# Patient Record
Sex: FEMALE | Race: WHITE
[De-identification: names, ages, dates, MRNs, and addresses within clinical notes are randomized per-mention and may not be internally consistent; named-entity substitution may affect disease eponyms.]

---

## 2017-09-22 LAB
CHOLESTEROL, TOTAL: NORMAL
CHOLESTEROL/HDL RATIO: NORMAL
HDLC SERPL-MCNC: NORMAL MG/DL
LDL CHOLESTEROL CALCULATED: NORMAL
TRIGL SERPL-MCNC: NORMAL MG/DL
VLDLC SERPL CALC-MCNC: NORMAL MG/DL

## 2019-02-16 ENCOUNTER — HOSPITAL ENCOUNTER (EMERGENCY)
Dept: HOSPITAL 83 - ED | Age: 49
Discharge: HOME | End: 2019-02-16
Payer: COMMERCIAL

## 2019-02-16 VITALS — WEIGHT: 240 LBS | HEIGHT: 65 IN | BODY MASS INDEX: 39.99 KG/M2

## 2019-02-16 VITALS — DIASTOLIC BLOOD PRESSURE: 77 MMHG

## 2019-02-16 DIAGNOSIS — Z79.899: ICD-10-CM

## 2019-02-16 DIAGNOSIS — R03.0: ICD-10-CM

## 2019-02-16 DIAGNOSIS — R10.31: Primary | ICD-10-CM

## 2019-02-16 DIAGNOSIS — Z79.2: ICD-10-CM

## 2019-02-16 DIAGNOSIS — E66.01: ICD-10-CM

## 2019-02-16 DIAGNOSIS — Z88.1: ICD-10-CM

## 2019-02-16 LAB
ALBUMIN SERPL-MCNC: 3.8 GM/DL (ref 3.1–4.5)
ALP SERPL-CCNC: 92 U/L (ref 45–117)
ALT SERPL W P-5'-P-CCNC: 25 U/L (ref 12–78)
APPEARANCE UR: (no result)
AST SERPL-CCNC: 15 IU/L (ref 3–35)
BACTERIA #/AREA URNS HPF: (no result) /[HPF]
BASOPHILS # BLD AUTO: 0.1 10*3/UL (ref 0–0.1)
BASOPHILS NFR BLD AUTO: 0.6 % (ref 0–1)
BILIRUB UR QL STRIP: NEGATIVE
BUN SERPL-MCNC: 14 MG/DL (ref 7–24)
CHLORIDE SERPL-SCNC: 108 MMOL/L (ref 98–107)
COLOR UR: YELLOW
CREAT SERPL-MCNC: 0.83 MG/DL (ref 0.55–1.02)
EOSINOPHIL # BLD AUTO: 0.1 10*3/UL (ref 0–0.4)
EOSINOPHIL # BLD AUTO: 1.1 % (ref 1–4)
EPI CELLS #/AREA URNS HPF: (no result) /[HPF]
ERYTHROCYTE [DISTWIDTH] IN BLOOD BY AUTOMATED COUNT: 12.9 % (ref 0–14.5)
GLUCOSE UR QL: NEGATIVE
HCT VFR BLD AUTO: 41.1 % (ref 37–47)
HGB BLD-MCNC: 13.6 G/DL (ref 12–16)
HGB UR QL STRIP: NEGATIVE
KETONES UR QL STRIP: NEGATIVE
LEUKOCYTE ESTERASE UR QL STRIP: NEGATIVE
LIPASE SERPL-CCNC: 111 U/L (ref 73–393)
LYMPHOCYTES # BLD AUTO: 2.3 10*3/UL (ref 1.3–4.4)
LYMPHOCYTES NFR BLD AUTO: 25.3 % (ref 27–41)
MCH RBC QN AUTO: 30.6 PG (ref 27–31)
MCHC RBC AUTO-ENTMCNC: 33.1 G/DL (ref 33–37)
MCV RBC AUTO: 92.6 FL (ref 81–99)
MONOCYTES # BLD AUTO: 0.6 10*3/UL (ref 0.1–1)
MONOCYTES NFR BLD MANUAL: 6.5 % (ref 3–9)
NEUT #: 6 10*3/UL (ref 2.3–7.9)
NEUT %: 65.8 % (ref 47–73)
NITRITE UR QL STRIP: NEGATIVE
NRBC BLD QL AUTO: 0 10*3/UL (ref 0–0)
PH UR STRIP: 6.5 [PH] (ref 5–9)
PLATELET # BLD AUTO: 227 10*3/UL (ref 130–400)
PMV BLD AUTO: 9.6 FL (ref 9.6–12.3)
POTASSIUM SERPL-SCNC: 4.3 MMOL/L (ref 3.5–5.1)
PROT SERPL-MCNC: 7.8 GM/DL (ref 6.4–8.2)
RBC # BLD AUTO: 4.44 10*6/UL (ref 4.1–5.1)
RBC #/AREA URNS HPF: (no result) RBC/HPF (ref 0–2)
SODIUM SERPL-SCNC: 142 MMOL/L (ref 136–145)
SP GR UR: 1.02 (ref 1–1.03)
UROBILINOGEN UR STRIP-MCNC: 0.2 E.U./DL (ref 0.2–1)
WBC #/AREA URNS HPF: (no result) WBC/HPF (ref 0–5)
WBC NRBC COR # BLD AUTO: 9.1 10*3/UL (ref 4.8–10.8)

## 2020-01-27 RX ORDER — FAMCICLOVIR 500 MG/1
500 TABLET, FILM COATED ORAL DAILY
COMMUNITY
Start: 2010-06-30 | End: 2020-09-21

## 2020-03-23 ENCOUNTER — TELEPHONE (OUTPATIENT)
Dept: FAMILY MEDICINE CLINIC | Age: 50
End: 2020-03-23

## 2020-03-23 NOTE — TELEPHONE ENCOUNTER
Since its been almost 2 years since we last saw her 7-21, would need to see her if we are going to give her more than 20 with no refills to get through the Coronovirus crisis which I probably could do.

## 2020-05-06 ENCOUNTER — TELEPHONE (OUTPATIENT)
Dept: PRIMARY CARE CLINIC | Age: 50
End: 2020-05-06

## 2020-05-06 NOTE — TELEPHONE ENCOUNTER
Pt's son tested positive for covid on 4/26. She has been on quarantine for 14 days with her son as advised by her employer. The 14 day quarantine will be up on 5/10. Her son has a covid follow up at [facility] on 5/12. She has no symptoms and has not had any yet while on quarantine. She would like to return to work after 5/10 but she is being told by the [facility] that she has to be on quarantine for 14 more days and she is unable to start the 14 days until her son is symptom free for 10 days and is cleared of COVID. Pt is concerned about being off of work for that amount of time. Please advise on what she should do.

## 2020-09-21 ENCOUNTER — HOSPITAL ENCOUNTER (OUTPATIENT)
Age: 50
Discharge: HOME | End: 2020-09-23
Payer: COMMERCIAL

## 2020-09-21 ENCOUNTER — OFFICE VISIT (OUTPATIENT)
Dept: FAMILY MEDICINE CLINIC | Age: 50
End: 2020-09-21
Payer: COMMERCIAL

## 2020-09-21 VITALS
BODY MASS INDEX: 45.45 KG/M2 | DIASTOLIC BLOOD PRESSURE: 88 MMHG | HEART RATE: 127 BPM | TEMPERATURE: 97.7 F | OXYGEN SATURATION: 96 % | SYSTOLIC BLOOD PRESSURE: 176 MMHG | HEIGHT: 65 IN | WEIGHT: 272.8 LBS

## 2020-09-21 PROBLEM — I10 ESSENTIAL HYPERTENSION: Status: ACTIVE | Noted: 2020-09-21

## 2020-09-21 PROBLEM — G93.2 PSEUDOTUMOR CEREBRI: Status: ACTIVE | Noted: 2020-09-21

## 2020-09-21 LAB
ALBUMIN SERPL-MCNC: 4.4 G/DL (ref 3.5–5.2)
ALP BLD-CCNC: 81 U/L (ref 35–104)
ALT SERPL-CCNC: 25 U/L (ref 0–32)
ANION GAP SERPL CALCULATED.3IONS-SCNC: 18 MMOL/L (ref 7–16)
AST SERPL-CCNC: 27 U/L (ref 0–31)
BASOPHILS ABSOLUTE: 0.05 E9/L (ref 0–0.2)
BASOPHILS RELATIVE PERCENT: 0.4 % (ref 0–2)
BILIRUB SERPL-MCNC: <0.2 MG/DL (ref 0–1.2)
BILIRUBIN URINE: NEGATIVE
BLOOD, URINE: NEGATIVE
BUN BLDV-MCNC: 11 MG/DL (ref 6–20)
CALCIUM SERPL-MCNC: 9.8 MG/DL (ref 8.6–10.2)
CHLORIDE BLD-SCNC: 104 MMOL/L (ref 98–107)
CHOLESTEROL, TOTAL: 197 MG/DL (ref 0–199)
CLARITY: CLEAR
CO2: 20 MMOL/L (ref 22–29)
COLOR: YELLOW
CREAT SERPL-MCNC: 0.8 MG/DL (ref 0.5–1)
EOSINOPHILS ABSOLUTE: 0.1 E9/L (ref 0.05–0.5)
EOSINOPHILS RELATIVE PERCENT: 0.8 % (ref 0–6)
GFR AFRICAN AMERICAN: >60
GFR NON-AFRICAN AMERICAN: >60 ML/MIN/1.73
GLUCOSE BLD-MCNC: 69 MG/DL (ref 74–99)
GLUCOSE URINE: NEGATIVE MG/DL
HCT VFR BLD CALC: 43.8 % (ref 34–48)
HDLC SERPL-MCNC: 44 MG/DL
HEMOGLOBIN: 13.6 G/DL (ref 11.5–15.5)
IMMATURE GRANULOCYTES #: 0.06 E9/L
IMMATURE GRANULOCYTES %: 0.5 % (ref 0–5)
KETONES, URINE: NEGATIVE MG/DL
LDL CHOLESTEROL CALCULATED: 89 MG/DL (ref 0–99)
LEUKOCYTE ESTERASE, URINE: NEGATIVE
LYMPHOCYTES ABSOLUTE: 3.55 E9/L (ref 1.5–4)
LYMPHOCYTES RELATIVE PERCENT: 30.1 % (ref 20–42)
MCH RBC QN AUTO: 29.8 PG (ref 26–35)
MCHC RBC AUTO-ENTMCNC: 31.1 % (ref 32–34.5)
MCV RBC AUTO: 95.8 FL (ref 80–99.9)
MONOCYTES ABSOLUTE: 0.87 E9/L (ref 0.1–0.95)
MONOCYTES RELATIVE PERCENT: 7.4 % (ref 2–12)
NEUTROPHILS ABSOLUTE: 7.18 E9/L (ref 1.8–7.3)
NEUTROPHILS RELATIVE PERCENT: 60.8 % (ref 43–80)
NITRITE, URINE: NEGATIVE
PDW BLD-RTO: 13.3 FL (ref 11.5–15)
PH UA: 6.5 (ref 5–9)
PLATELET # BLD: 260 E9/L (ref 130–450)
PMV BLD AUTO: 10 FL (ref 7–12)
POTASSIUM SERPL-SCNC: 4.3 MMOL/L (ref 3.5–5)
PROTEIN UA: NEGATIVE MG/DL
RBC # BLD: 4.57 E12/L (ref 3.5–5.5)
SODIUM BLD-SCNC: 142 MMOL/L (ref 132–146)
SPECIFIC GRAVITY UA: 1.02 (ref 1–1.03)
TOTAL PROTEIN: 7.2 G/DL (ref 6.4–8.3)
TRIGL SERPL-MCNC: 319 MG/DL (ref 0–149)
TSH SERPL DL<=0.05 MIU/L-ACNC: 4.32 UIU/ML (ref 0.27–4.2)
UROBILINOGEN, URINE: 0.2 E.U./DL
VLDLC SERPL CALC-MCNC: 64 MG/DL
WBC # BLD: 11.8 E9/L (ref 4.5–11.5)

## 2020-09-21 PROCEDURE — G8417 CALC BMI ABV UP PARAM F/U: HCPCS | Performed by: FAMILY MEDICINE

## 2020-09-21 PROCEDURE — 81003 URINALYSIS AUTO W/O SCOPE: CPT

## 2020-09-21 PROCEDURE — G8427 DOCREV CUR MEDS BY ELIG CLIN: HCPCS | Performed by: FAMILY MEDICINE

## 2020-09-21 PROCEDURE — 1036F TOBACCO NON-USER: CPT | Performed by: FAMILY MEDICINE

## 2020-09-21 PROCEDURE — 84443 ASSAY THYROID STIM HORMONE: CPT

## 2020-09-21 PROCEDURE — 85025 COMPLETE CBC W/AUTO DIFF WBC: CPT

## 2020-09-21 PROCEDURE — 36415 COLL VENOUS BLD VENIPUNCTURE: CPT

## 2020-09-21 PROCEDURE — 99214 OFFICE O/P EST MOD 30 MIN: CPT | Performed by: FAMILY MEDICINE

## 2020-09-21 PROCEDURE — 80053 COMPREHEN METABOLIC PANEL: CPT

## 2020-09-21 PROCEDURE — 80061 LIPID PANEL: CPT

## 2020-09-21 RX ORDER — LORAZEPAM 1 MG/1
TABLET ORAL
Qty: 20 TABLET | Refills: 1 | Status: SHIPPED
Start: 2020-09-21 | End: 2020-11-17 | Stop reason: SDUPTHER

## 2020-09-21 RX ORDER — ESCITALOPRAM OXALATE 10 MG/1
10 TABLET ORAL DAILY
Qty: 30 TABLET | Refills: 5 | Status: SHIPPED
Start: 2020-09-21 | End: 2021-03-11 | Stop reason: SDUPTHER

## 2020-09-21 RX ORDER — CARVEDILOL 6.25 MG/1
6.25 TABLET ORAL DAILY
Qty: 30 TABLET | Refills: 5 | Status: SHIPPED
Start: 2020-09-21 | End: 2021-03-11 | Stop reason: SDUPTHER

## 2020-09-21 SDOH — HEALTH STABILITY: MENTAL HEALTH: HOW OFTEN DO YOU HAVE A DRINK CONTAINING ALCOHOL?: MONTHLY OR LESS

## 2020-09-21 SDOH — HEALTH STABILITY: MENTAL HEALTH: HOW MANY STANDARD DRINKS CONTAINING ALCOHOL DO YOU HAVE ON A TYPICAL DAY?: 1 OR 2

## 2020-09-21 ASSESSMENT — ENCOUNTER SYMPTOMS
ABDOMINAL PAIN: 0
VOMITING: 0
WHEEZING: 0
DIARRHEA: 0
CONSTIPATION: 0
COUGH: 0
BLOOD IN STOOL: 0
EYES NEGATIVE: 1
CHEST TIGHTNESS: 0

## 2020-09-21 ASSESSMENT — PATIENT HEALTH QUESTIONNAIRE - PHQ9
SUM OF ALL RESPONSES TO PHQ9 QUESTIONS 1 & 2: 0
SUM OF ALL RESPONSES TO PHQ QUESTIONS 1-9: 0
2. FEELING DOWN, DEPRESSED OR HOPELESS: 0
1. LITTLE INTEREST OR PLEASURE IN DOING THINGS: 0
SUM OF ALL RESPONSES TO PHQ QUESTIONS 1-9: 0

## 2020-09-21 NOTE — PROGRESS NOTES
OFFICE NOTE    20  Name: Martin Cooper  CY   Sex:female   Age:49 y.o. SUBJECTIVE  Chief Complaint   Patient presents with    Anxiety       Patient presents for routine follow up. Has not been seen since 2018. Having issues with anxiety and depression. work is very stressful. Son has had some issues (switching majors and schools, COVID 19 and now back problems. She was advised by Neurologist she no longer needs to take meds for pseudotumor cerebri         Review of Systems   Constitutional: Negative for appetite change, fever and unexpected weight change. HENT: Negative for congestion, ear pain and postnasal drip. Eyes: Negative. Respiratory: Negative for cough, chest tightness and wheezing. Cardiovascular: Negative for chest pain and palpitations. Gastrointestinal: Negative for abdominal pain, blood in stool, constipation, diarrhea and vomiting. Endocrine: Negative for cold intolerance, polydipsia and polyuria. Genitourinary: Negative for dysuria and hematuria. Musculoskeletal: Negative for arthralgias, gait problem and joint swelling. Skin: Negative for rash and wound. Allergic/Immunologic: Negative for environmental allergies and food allergies. Neurological: Negative for dizziness, tremors, weakness and headaches. Hematological: Negative for adenopathy. Does not bruise/bleed easily. Psychiatric/Behavioral: Negative for behavioral problems, confusion, dysphoric mood and sleep disturbance. The patient is nervous/anxious.              Current Outpatient Medications:     escitalopram (LEXAPRO) 10 MG tablet, Take 1 tablet by mouth daily, Disp: 30 tablet, Rfl: 5    LORazepam (ATIVAN) 1 MG tablet, Once daily prn anxiety as rescue, Disp: 20 tablet, Rfl: 1    carvedilol (COREG) 6.25 MG tablet, Take 1 tablet by mouth daily, Disp: 30 tablet, Rfl: 5    Multiple Vitamins-Minerals (THERAPEUTIC MULTIVITAMIN-MINERALS) tablet, Take 1 tablet by mouth daily LD 17, Disp: , Rfl:   Allergies   Allergen Reactions    Doxycycline Rash       Past Medical History:   Diagnosis Date    Pseudotumor cerebri      Past Surgical History:   Procedure Laterality Date    BACK SURGERY  11/2015    CHOLECYSTECTOMY, LAPAROSCOPIC  06/01/2017    ENDOMETRIAL ABLATION  12/2012     Family History   Problem Relation Age of Onset    Cancer Mother         breast    No Known Problems Father     Cancer Maternal Grandfather         prostate    No Known Problems Paternal Grandmother     No Known Problems Paternal Grandfather      Social History     Tobacco History     Smoking Status  Never Smoker    Smokeless Tobacco Use  Never Used          Alcohol History     Alcohol Use Status  Yes Comment  social          Drug Use     Drug Use Status  No          Sexual Activity     Sexually Active  Not Currently Partners  Male              OBJECTIVE  Vitals:    09/21/20 1048   BP: (!) 176/88   Site: Right Upper Arm   Position: Sitting   Pulse: 127   Temp: 97.7 °F (36.5 °C)   TempSrc: Temporal   SpO2: 96%   Weight: 272 lb 12.8 oz (123.7 kg)   Height: 5' 5\" (1.651 m)        Body mass index is 45.4 kg/m².     Patient is obese    Orders Placed This Encounter   Procedures    CBC Auto Differential     Standing Status:   Future     Number of Occurrences:   1     Standing Expiration Date:   9/21/2021    Comprehensive Metabolic Panel     Standing Status:   Future     Number of Occurrences:   1     Standing Expiration Date:   9/21/2021    Lipid Panel     Standing Status:   Future     Number of Occurrences:   1     Standing Expiration Date:   9/21/2021     Order Specific Question:   Is Patient Fasting?/# of Hours     Answer:   8    TSH without Reflex     Standing Status:   Future     Number of Occurrences:   1     Standing Expiration Date:   9/21/2021    Urinalysis     Standing Status:   Future     Number of Occurrences:   1     Standing Expiration Date:   9/21/2021     Order Specific Question: SPECIFY(EX-CATH,MIDSTREAM,CYSTO,ETC)? Answer:   midstream        EXAM   Physical Exam  Vitals signs and nursing note reviewed. Constitutional:       Appearance: Normal appearance. She is well-developed. She is obese. HENT:      Right Ear: Tympanic membrane, ear canal and external ear normal.      Left Ear: Tympanic membrane, ear canal and external ear normal.      Nose: Nose normal.      Mouth/Throat:      Pharynx: Oropharynx is clear. Eyes:      General: No scleral icterus. Conjunctiva/sclera: Conjunctivae normal.      Pupils: Pupils are equal, round, and reactive to light. Neck:      Musculoskeletal: Neck supple. Thyroid: No thyroid mass or thyromegaly. Vascular: No carotid bruit or JVD. Trachea: Trachea normal.   Cardiovascular:      Rate and Rhythm: Normal rate and regular rhythm. Pulses: Normal pulses. Heart sounds: Normal heart sounds. No murmur. No gallop. Pulmonary:      Effort: Pulmonary effort is normal.      Breath sounds: Normal breath sounds. No wheezing, rhonchi or rales. Abdominal:      General: Bowel sounds are normal. There is no distension. Palpations: Abdomen is soft. There is no mass. Tenderness: There is no abdominal tenderness. There is no guarding. Hernia: No hernia is present. Musculoskeletal: Normal range of motion. General: No swelling or tenderness. Right lower leg: No edema. Left lower leg: No edema. Lymphadenopathy:      Cervical: No cervical adenopathy. Skin:     General: Skin is warm and dry. Capillary Refill: Capillary refill takes less than 2 seconds. Coloration: Skin is not jaundiced. Findings: No bruising or rash. Neurological:      General: No focal deficit present. Mental Status: She is alert and oriented to person, place, and time. Sensory: No sensory deficit. Motor: No weakness or abnormal muscle tone.       Coordination: Coordination normal.      Gait: Gait normal.   Psychiatric:         Mood and Affect: Mood normal.         Behavior: Behavior normal.           [facility] for anxiety. Diagnoses and all orders for this visit:    Encounter for general adult medical examination without abnormal findings  -     CBC Auto Differential; Future  -     Comprehensive Metabolic Panel; Future  -     Lipid Panel; Future  -     TSH without Reflex; Future  -     Urinalysis; Future  Needed caught up on BW etc.   Anxiety  -     CBC Auto Differential; Future  -     Comprehensive Metabolic Panel; Future  -     Lipid Panel; Future  -     TSH without Reflex; Future  -     Urinalysis; Future  -     LORazepam (ATIVAN) 1 MG tablet; Once daily prn anxiety as rescue  Has been on SSRI and Lamictal which she did not like in past    Pseudotumor cerebri  -     CBC Auto Differential; Future  -     Comprehensive Metabolic Panel; Future  -     Lipid Panel; Future  -     TSH without Reflex; Future  -     Urinalysis; Future  Resolved, off Diamox    Essential hypertension. Anxious but quite high today. Will try Carvediol    Other orders  -     escitalopram (LEXAPRO) 10 MG tablet; Take 1 tablet by mouth daily  -     carvedilol (COREG) 6.25 MG tablet; Take 1 tablet by mouth daily          Return in about 1 month (around 10/21/2020). Electronically signed by Khushi Flynn MD on 9/21/20 at 12:08 PM EDT    I have personally reviewed and updated the chief complaint, HPI, Past Medical, Family and Social History, as well as the above Review of Systems.

## 2020-09-25 ENCOUNTER — TELEPHONE (OUTPATIENT)
Dept: ADMINISTRATIVE | Age: 50
End: 2020-09-25

## 2020-09-25 NOTE — TELEPHONE ENCOUNTER
Pt was returning a call back to [address]? With information she needed to give her. [facility] tried to connect with office, got an all circuits busy message twice.   Pt would appreciate a call back concerning this matter

## 2020-10-22 ENCOUNTER — OFFICE VISIT (OUTPATIENT)
Dept: FAMILY MEDICINE CLINIC | Age: 50
End: 2020-10-22
Payer: COMMERCIAL

## 2020-10-22 VITALS
TEMPERATURE: 97.1 F | OXYGEN SATURATION: 92 % | DIASTOLIC BLOOD PRESSURE: 78 MMHG | SYSTOLIC BLOOD PRESSURE: 118 MMHG | HEART RATE: 102 BPM

## 2020-10-22 PROCEDURE — G8417 CALC BMI ABV UP PARAM F/U: HCPCS | Performed by: FAMILY MEDICINE

## 2020-10-22 PROCEDURE — G8427 DOCREV CUR MEDS BY ELIG CLIN: HCPCS | Performed by: FAMILY MEDICINE

## 2020-10-22 PROCEDURE — G8484 FLU IMMUNIZE NO ADMIN: HCPCS | Performed by: FAMILY MEDICINE

## 2020-10-22 PROCEDURE — 1036F TOBACCO NON-USER: CPT | Performed by: FAMILY MEDICINE

## 2020-10-22 PROCEDURE — 99213 OFFICE O/P EST LOW 20 MIN: CPT | Performed by: FAMILY MEDICINE

## 2020-10-22 ASSESSMENT — ENCOUNTER SYMPTOMS
BLOOD IN STOOL: 0
VOMITING: 0
COUGH: 0
ABDOMINAL PAIN: 0
WHEEZING: 0
EYES NEGATIVE: 1
CONSTIPATION: 0
DIARRHEA: 0
CHEST TIGHTNESS: 0

## 2020-10-22 NOTE — PROGRESS NOTES
OFFICE NOTE    10/22/20  Name: Adriel Lim  JHE:[birth year]   Sex:female   Age:49 y.o. SUBJECTIVE  Chief Complaint   Patient presents with    Anxiety    Hypertension       Patient presents for routine follow up post medication adjustment. Denies further complaints. Tolerating both coreg and lexapro. Feels like overall anxiety is lessening. Son's problems seem to be lessening      Review of Systems   Constitutional: Negative for appetite change, fever and unexpected weight change. HENT: Negative for congestion, ear pain and postnasal drip. Eyes: Negative. Negative for photophobia and redness. Respiratory: Negative for cough, chest tightness, shortness of breath and wheezing. Cardiovascular: Negative for chest pain and palpitations. Gastrointestinal: Negative for abdominal pain, blood in stool, constipation, diarrhea and vomiting. Endocrine: Negative for cold intolerance, polydipsia and polyuria. Genitourinary: Positive for urgency. Negative for dysuria and hematuria. Musculoskeletal: Positive for arthralgias. Negative for gait problem and joint swelling. Skin: Negative for rash and wound. Allergic/Immunologic: Negative for environmental allergies and food allergies. Neurological: Negative for dizziness, tremors, seizures, weakness, numbness and headaches. Hematological: Negative for adenopathy. Does not bruise/bleed easily. Psychiatric/Behavioral: Negative for behavioral problems, confusion, dysphoric mood and sleep disturbance. The patient is nervous/anxious. Some OCD issues which are improving also.             Current Outpatient Medications:     tretinoin (RETIN-A) 0.025 % cream, Apply topically nightly., Disp: 45 g, Rfl: 1    escitalopram (LEXAPRO) 10 MG tablet, Take 1 tablet by mouth daily, Disp: 30 tablet, Rfl: 5    LORazepam (ATIVAN) 1 MG tablet, Once daily prn anxiety as rescue, Disp: 20 tablet, Rfl: 1    carvedilol (COREG) 6.25 MG tablet, Take 1 tablet by mouth daily, Disp: 30 tablet, Rfl: 5    Multiple Vitamins-Minerals (THERAPEUTIC MULTIVITAMIN-MINERALS) tablet, Take 1 tablet by mouth daily LD 5-23-17, Disp: , Rfl:   Allergies   Allergen Reactions    Doxycycline Rash       Past Medical History:   Diagnosis Date    Pseudotumor cerebri      Past Surgical History:   Procedure Laterality Date    BACK SURGERY  11/2015    CHOLECYSTECTOMY, LAPAROSCOPIC  06/01/2017    ENDOMETRIAL ABLATION  12/2012     Family History   Problem Relation Age of Onset    Cancer Mother         breast    No Known Problems Father     Cancer Maternal Grandfather         prostate    No Known Problems Paternal Grandmother     No Known Problems Paternal Grandfather      Social History     Tobacco History     Smoking Status  Never Smoker    Smokeless Tobacco Use  Never Used          Alcohol History     Alcohol Use Status  Yes Comment  social          Drug Use     Drug Use Status  No          Sexual Activity     Sexually Active  Not Currently Partners  Male              OBJECTIVE  Vitals:    10/22/20 0939   BP: 118/78   Site: Right Upper Arm   Position: Sitting   Pulse: 102   Temp: 97.1 °F (36.2 °C)   TempSrc: Temporal   SpO2: 92%        There is no height or weight on file to calculate BMI. Patient is obese    No orders of the defined types were placed in this encounter. EXAM   Physical Exam  Vitals signs and nursing note reviewed. Constitutional:       Appearance: Normal appearance. She is well-developed. She is obese. HENT:      Right Ear: Tympanic membrane, ear canal and external ear normal.      Left Ear: Tympanic membrane, ear canal and external ear normal.      Nose: Nose normal.   Eyes:      General: No scleral icterus. Conjunctiva/sclera: Conjunctivae normal.      Pupils: Pupils are equal, round, and reactive to light. Neck:      Musculoskeletal: Neck supple. No muscular tenderness. Thyroid: No thyroid mass or thyromegaly. Vascular: No JVD. Trachea: Trachea normal.   Cardiovascular:      Rate and Rhythm: Normal rate and regular rhythm. Pulses: Normal pulses. Heart sounds: Normal heart sounds. No murmur. No gallop. Pulmonary:      Effort: Pulmonary effort is normal.      Breath sounds: Normal breath sounds. No wheezing, rhonchi or rales. Abdominal:      General: Bowel sounds are normal. There is no distension. Palpations: Abdomen is soft. There is no mass. Tenderness: There is no abdominal tenderness. There is no guarding. Musculoskeletal: Normal range of motion. General: No swelling or tenderness. Right lower leg: No edema. Left lower leg: No edema. Lymphadenopathy:      Cervical: No cervical adenopathy. Skin:     General: Skin is warm and dry. Capillary Refill: Capillary refill takes less than 2 seconds. Coloration: Skin is not jaundiced. Findings: No bruising or rash. Neurological:      General: No focal deficit present. Mental Status: She is alert and oriented to person, place, and time. Motor: No abnormal muscle tone. Psychiatric:         Mood and Affect: Mood normal.         Behavior: Behavior normal.           Arabella Akers was seen today for anxiety and hypertension. Diagnoses and all orders for this visit:    Essential hypertension  Well controlled no changes made. Pseudotumor cerebri. Was taken off Diamox and seems to be doing well. Should see her eye doctor annually    Anxiety. Rarely takes ativan. Feeling much better and in control    Other orders  -     tretinoin (RETIN-A) 0.025 % cream; Apply topically nightly. Return in about 5 months (around 3/22/2021). Electronically signed by Galileo Hurley MD on 10/22/20 at 10:11 AM EDT    I have personally reviewed and updated the chief complaint, HPI, Past Medical, Family and Social History, as well as the above Review of Systems.

## 2020-10-23 PROBLEM — F41.9 ANXIETY: Status: ACTIVE | Noted: 2020-10-23

## 2020-10-23 ASSESSMENT — ENCOUNTER SYMPTOMS
PHOTOPHOBIA: 0
EYE REDNESS: 0
SHORTNESS OF BREATH: 0

## 2020-11-17 RX ORDER — LORAZEPAM 1 MG/1
TABLET ORAL
Qty: 20 TABLET | Refills: 1 | Status: SHIPPED
Start: 2020-11-17 | End: 2021-03-11 | Stop reason: SDUPTHER

## 2020-11-17 RX ORDER — FAMCICLOVIR 500 MG/1
500 TABLET, FILM COATED ORAL DAILY
Qty: 30 TABLET | Refills: 5 | Status: SHIPPED
Start: 2020-11-17 | End: 2021-03-11 | Stop reason: SDUPTHER

## 2020-11-17 NOTE — TELEPHONE ENCOUNTER
Last Appointment:  10/22/2020  Future Appointments   Date Time Provider Andre Salazar   3/18/2021  8:30 AM Patricia Ybarra  [address] [facility] [address]

## 2020-12-03 ENCOUNTER — HOSPITAL ENCOUNTER (OUTPATIENT)
Age: 50
Discharge: HOME | End: 2020-12-05

## 2020-12-03 PROCEDURE — 88305 TISSUE EXAM BY PATHOLOGIST: CPT

## 2020-12-04 ENCOUNTER — HOSPITAL ENCOUNTER (OUTPATIENT)
Age: 50
Discharge: HOME | End: 2020-12-06

## 2020-12-04 LAB
ANION GAP SERPL CALCULATED.3IONS-SCNC: 9 MMOL/L (ref 7–16)
BUN BLDV-MCNC: 8 MG/DL (ref 6–20)
CALCIUM SERPL-MCNC: 9.3 MG/DL (ref 8.6–10.2)
CHLORIDE BLD-SCNC: 101 MMOL/L (ref 98–107)
CO2: 26 MMOL/L (ref 22–29)
CREAT SERPL-MCNC: 0.7 MG/DL (ref 0.5–1)
GFR AFRICAN AMERICAN: >60
GFR NON-AFRICAN AMERICAN: >60 ML/MIN/1.73
GLUCOSE BLD-MCNC: 133 MG/DL (ref 74–99)
HCT VFR BLD CALC: 37.6 % (ref 34–48)
HEMOGLOBIN: 12.1 G/DL (ref 11.5–15.5)
MCH RBC QN AUTO: 30.2 PG (ref 26–35)
MCHC RBC AUTO-ENTMCNC: 32.2 % (ref 32–34.5)
MCV RBC AUTO: 93.8 FL (ref 80–99.9)
PDW BLD-RTO: 13.2 FL (ref 11.5–15)
PLATELET # BLD: 236 E9/L (ref 130–450)
PMV BLD AUTO: 10.3 FL (ref 7–12)
POTASSIUM SERPL-SCNC: 4.1 MMOL/L (ref 3.5–5)
RBC # BLD: 4.01 E12/L (ref 3.5–5.5)
SODIUM BLD-SCNC: 136 MMOL/L (ref 132–146)
WBC # BLD: 17.9 E9/L (ref 4.5–11.5)

## 2020-12-04 PROCEDURE — 80048 BASIC METABOLIC PNL TOTAL CA: CPT

## 2020-12-04 PROCEDURE — 85027 COMPLETE CBC AUTOMATED: CPT

## 2021-02-04 ENCOUNTER — OFFICE VISIT (OUTPATIENT)
Dept: FAMILY MEDICINE CLINIC | Age: 51
End: 2021-02-04
Payer: COMMERCIAL

## 2021-02-04 VITALS
SYSTOLIC BLOOD PRESSURE: 130 MMHG | HEIGHT: 65 IN | OXYGEN SATURATION: 98 % | RESPIRATION RATE: 16 BRPM | DIASTOLIC BLOOD PRESSURE: 82 MMHG | HEART RATE: 82 BPM | BODY MASS INDEX: 45.48 KG/M2 | TEMPERATURE: 97.7 F | WEIGHT: 273 LBS

## 2021-02-04 DIAGNOSIS — G58.8 INTERCOSTAL NEURALGIA: Primary | ICD-10-CM

## 2021-02-04 PROCEDURE — 99213 OFFICE O/P EST LOW 20 MIN: CPT | Performed by: FAMILY MEDICINE

## 2021-02-04 PROCEDURE — 96372 THER/PROPH/DIAG INJ SC/IM: CPT | Performed by: FAMILY MEDICINE

## 2021-02-04 RX ORDER — METHYLPREDNISOLONE 4 MG/1
TABLET ORAL
Qty: 1 KIT | Refills: 0 | Status: SHIPPED
Start: 2021-02-04 | End: 2021-03-11

## 2021-02-04 RX ORDER — IBUPROFEN 600 MG/1
300 TABLET ORAL EVERY 6 HOURS PRN
Qty: 60 TABLET | Refills: 1 | Status: SHIPPED
Start: 2021-02-04 | End: 2021-03-11

## 2021-02-04 RX ORDER — CYCLOBENZAPRINE HCL 5 MG
5 TABLET ORAL 3 TIMES DAILY PRN
Qty: 21 TABLET | Refills: 0 | Status: SHIPPED
Start: 2021-02-04 | End: 2021-03-11

## 2021-02-04 RX ORDER — METHYLPREDNISOLONE ACETATE 80 MG/ML
60 INJECTION, SUSPENSION INTRA-ARTICULAR; INTRALESIONAL; INTRAMUSCULAR; SOFT TISSUE ONCE
Status: COMPLETED | OUTPATIENT
Start: 2021-02-04 | End: 2021-02-04

## 2021-02-04 RX ADMIN — METHYLPREDNISOLONE ACETATE 60 MG: 80 INJECTION, SUSPENSION INTRA-ARTICULAR; INTRALESIONAL; INTRAMUSCULAR; SOFT TISSUE at 12:48

## 2021-02-04 ASSESSMENT — ENCOUNTER SYMPTOMS
RESPIRATORY NEGATIVE: 1
BACK PAIN: 1
COLOR CHANGE: 0

## 2021-02-04 NOTE — PROGRESS NOTES
21  Estela Grandchild : 1970 Sex: female  Age: 53 y.o. Chief Complaint   Patient presents with    Back Pain     started  getting worse, history of kidney stones years ago    Nausea       She is a 28-year-old white female with chief complaint of pain in her mid thoracic spine with radiation along the intercostal muscles at ribs 5 6-7 on the left. There is palpatory tenderness intercostally on palpation. She has no cough shortness of breath no history of trauma or injury. No shortness of breath no pain with deep inspiration. No rashes evident no vesiculation. Review of Systems   Constitutional: Negative. Respiratory: Negative. Musculoskeletal: Positive for arthralgias, back pain and myalgias. Skin: Negative. Negative for color change, rash and wound.          Current Outpatient Medications:     methylPREDNISolone (MEDROL DOSEPACK) 4 MG tablet, Take by mouth., Disp: 1 kit, Rfl: 0    cyclobenzaprine (FLEXERIL) 5 MG tablet, Take 1 tablet by mouth 3 times daily as needed for Muscle spasms, Disp: 21 tablet, Rfl: 0    ibuprofen (ADVIL;MOTRIN) 600 MG tablet, Take 0.5 tablets by mouth every 6 hours as needed for Pain, Disp: 60 tablet, Rfl: 1    famciclovir (FAMVIR) 500 MG tablet, Take 1 tablet by mouth daily, Disp: 30 tablet, Rfl: 5    tretinoin (RETIN-A) 0.025 % cream, Apply topically nightly., Disp: 45 g, Rfl: 1    escitalopram (LEXAPRO) 10 MG tablet, Take 1 tablet by mouth daily, Disp: 30 tablet, Rfl: 5    carvedilol (COREG) 6.25 MG tablet, Take 1 tablet by mouth daily, Disp: 30 tablet, Rfl: 5    Multiple Vitamins-Minerals (THERAPEUTIC MULTIVITAMIN-MINERALS) tablet, Take 1 tablet by mouth daily LD 5-, Disp: , Rfl:   Allergies   Allergen Reactions    Doxycycline Rash       Past Medical History:   Diagnosis Date    Pseudotumor cerebri      Past Surgical History:   Procedure Laterality Date    BACK SURGERY  2015    CHOLECYSTECTOMY, LAPAROSCOPIC  2017    ENDOMETRIAL ABLATION  12/2012     Family History   Problem Relation Age of Onset    Cancer Mother         breast    No Known Problems Father     Cancer Maternal Grandfather         prostate    No Known Problems Paternal Grandmother     No Known Problems Paternal Grandfather      Social History     Tobacco Use    Smoking status: Never Smoker    Smokeless tobacco: Never Used   Substance Use Topics    Alcohol use: Yes     Frequency: Monthly or less     Drinks per session: 1 or 2     Binge frequency: Never     Comment: social    Drug use: No        Vitals:    02/04/21 1222   BP: 130/82   Pulse: 82   Resp: 16   Temp: 97.7 °F (36.5 °C)   TempSrc: Temporal   SpO2: 98%   Weight: 273 lb (123.8 kg)   Height: 5' 5\" (1.651 m)       Physical Exam  Vitals signs reviewed. Constitutional:       Appearance: Normal appearance. HENT:      Head: Normocephalic and atraumatic. Right Ear: Tympanic membrane, ear canal and external ear normal.      Left Ear: Tympanic membrane, ear canal and external ear normal.   Cardiovascular:      Rate and Rhythm: Normal rate and regular rhythm. Heart sounds: No murmur. Pulmonary:      Effort: Pulmonary effort is normal.      Breath sounds: Normal breath sounds. Abdominal:      General: Abdomen is flat. Palpations: Abdomen is soft. Musculoskeletal:         General: Tenderness present. No signs of injury. Skin:     General: Skin is warm and dry. Findings: No erythema or rash. Neurological:      Mental Status: She is alert. Assessment and Plan:  Sharla Rader was seen today for back pain and nausea. Diagnoses and all orders for this visit:    Intercostal neuralgia    Other orders  -     methylPREDNISolone (MEDROL DOSEPACK) 4 MG tablet; Take by mouth. -     cyclobenzaprine (FLEXERIL) 5 MG tablet; Take 1 tablet by mouth 3 times daily as needed for Muscle spasms  -     ibuprofen (ADVIL;MOTRIN) 600 MG tablet;  Take 0.5 tablets by mouth every 6 hours as needed for Pain        Orders Placed This Encounter   Medications    methylPREDNISolone (MEDROL DOSEPACK) 4 MG tablet     Sig: Take by mouth. Dispense:  1 kit     Refill:  0    cyclobenzaprine (FLEXERIL) 5 MG tablet     Sig: Take 1 tablet by mouth 3 times daily as needed for Muscle spasms     Dispense:  21 tablet     Refill:  0    ibuprofen (ADVIL;MOTRIN) 600 MG tablet     Sig: Take 0.5 tablets by mouth every 6 hours as needed for Pain     Dispense:  60 tablet     Refill:  1        Patient advised to follow up with PCP as needed. Seen By:  Julisa Tian, DO  She is to use some hot moist packs to the area 10 minutes 3 times daily in addition to increasing are improving her posture and I did recommend some massage therapy to the area.

## 2021-03-11 ENCOUNTER — OFFICE VISIT (OUTPATIENT)
Dept: FAMILY MEDICINE CLINIC | Age: 51
End: 2021-03-11
Payer: COMMERCIAL

## 2021-03-11 VITALS
WEIGHT: 260 LBS | HEART RATE: 96 BPM | OXYGEN SATURATION: 93 % | SYSTOLIC BLOOD PRESSURE: 130 MMHG | BODY MASS INDEX: 43.27 KG/M2 | TEMPERATURE: 97.8 F | DIASTOLIC BLOOD PRESSURE: 82 MMHG

## 2021-03-11 DIAGNOSIS — I10 ESSENTIAL HYPERTENSION: Primary | ICD-10-CM

## 2021-03-11 DIAGNOSIS — G93.2 PSEUDOTUMOR CEREBRI: ICD-10-CM

## 2021-03-11 DIAGNOSIS — F41.9 ANXIETY: ICD-10-CM

## 2021-03-11 PROCEDURE — 99214 OFFICE O/P EST MOD 30 MIN: CPT | Performed by: FAMILY MEDICINE

## 2021-03-11 RX ORDER — ESCITALOPRAM OXALATE 10 MG/1
10 TABLET ORAL DAILY
Qty: 30 TABLET | Refills: 5 | Status: SHIPPED
Start: 2021-03-11 | End: 2021-09-17 | Stop reason: SDUPTHER

## 2021-03-11 RX ORDER — LORAZEPAM 1 MG/1
TABLET ORAL
Qty: 20 TABLET | Refills: 1 | Status: SHIPPED
Start: 2021-03-11 | End: 2021-05-18 | Stop reason: SDUPTHER

## 2021-03-11 RX ORDER — FAMCICLOVIR 500 MG/1
500 TABLET, FILM COATED ORAL DAILY
Qty: 30 TABLET | Refills: 5 | Status: SHIPPED
Start: 2021-03-11 | End: 2021-09-17 | Stop reason: SDUPTHER

## 2021-03-11 RX ORDER — CARVEDILOL 6.25 MG/1
6.25 TABLET ORAL DAILY
Qty: 30 TABLET | Refills: 5 | Status: SHIPPED
Start: 2021-03-11 | End: 2021-08-05 | Stop reason: DRUGHIGH

## 2021-03-11 ASSESSMENT — ENCOUNTER SYMPTOMS
DIARRHEA: 0
EYE REDNESS: 0
PHOTOPHOBIA: 0
ABDOMINAL PAIN: 0
CONSTIPATION: 0
CHEST TIGHTNESS: 0
VOMITING: 0
EYES NEGATIVE: 1
WHEEZING: 0
COUGH: 0
BLOOD IN STOOL: 0

## 2021-03-11 ASSESSMENT — PATIENT HEALTH QUESTIONNAIRE - PHQ9
SUM OF ALL RESPONSES TO PHQ QUESTIONS 1-9: 0
1. LITTLE INTEREST OR PLEASURE IN DOING THINGS: 0

## 2021-03-11 NOTE — PROGRESS NOTES
OFFICE NOTE    3/11/21  Name: Terri Palmer  JLI:[id number]   Sex:female   Age:50 y.o. SUBJECTIVE  Chief Complaint   Patient presents with    Hypertension    Anxiety    Numbness     left leg/arm       Patient presents for routine follow up. Having issues with occasional numbness to L side extremities. reqruies routine medication refill.s   Had breast reduction surgery. Very happy with results Has been off Diamox for 2 years and doing well        Review of Systems   Constitutional: Positive for fatigue. Negative for appetite change, fever and unexpected weight change. HENT: Negative for congestion, ear pain and postnasal drip. Eyes: Negative. Negative for photophobia, redness and visual disturbance. Respiratory: Negative for cough, chest tightness and wheezing. Cardiovascular: Negative for chest pain and palpitations. Gastrointestinal: Negative for abdominal pain, blood in stool, constipation, diarrhea and vomiting. Endocrine: Negative for cold intolerance, polydipsia and polyuria. Genitourinary: Negative for dysuria, hematuria and urgency. Musculoskeletal: Positive for arthralgias. Negative for gait problem and joint swelling. Skin: Negative for rash and wound. Allergic/Immunologic: Negative for environmental allergies and food allergies. Neurological: Negative for dizziness, tremors, seizures, weakness, numbness and headaches. Hematological: Negative for adenopathy. Does not bruise/bleed easily. Psychiatric/Behavioral: Negative for behavioral problems, confusion, dysphoric mood and sleep disturbance. The patient is nervous/anxious.              Current Outpatient Medications:     carvedilol (COREG) 6.25 MG tablet, Take 1 tablet by mouth daily, Disp: 30 tablet, Rfl: 5    escitalopram (LEXAPRO) 10 MG tablet, Take 1 tablet by mouth daily, Disp: 30 tablet, Rfl: 5    famciclovir (FAMVIR) 500 MG tablet, Take 1 tablet by mouth daily, Disp: 30 tablet, Rfl: 5    LORazepam (ATIVAN) 1 MG tablet, Once daily prn anxiety as rescue, Disp: 20 tablet, Rfl: 1    tretinoin (RETIN-A) 0.025 % cream, Apply topically nightly., Disp: 45 g, Rfl: 1    Multiple Vitamins-Minerals (THERAPEUTIC MULTIVITAMIN-MINERALS) tablet, Take 1 tablet by mouth daily LD 5-23-17, Disp: , Rfl:   Allergies   Allergen Reactions    Doxycycline Rash       Past Medical History:   Diagnosis Date    Pseudotumor cerebri      Past Surgical History:   Procedure Laterality Date    BACK SURGERY  11/2015    CHOLECYSTECTOMY, LAPAROSCOPIC  06/01/2017    ENDOMETRIAL ABLATION  12/2012     Family History   Problem Relation Age of Onset    Cancer Mother         breast    No Known Problems Father     Cancer Maternal Grandfather         prostate    No Known Problems Paternal Grandmother     No Known Problems Paternal Grandfather      Social History     Tobacco History     Smoking Status  Never Smoker    Smokeless Tobacco Use  Never Used          Alcohol History     Alcohol Use Status  Yes Comment  social          Drug Use     Drug Use Status  No          Sexual Activity     Sexually Active  Not Currently Partners  Male              OBJECTIVE  Vitals:    03/11/21 1457   BP: 130/82   Site: Left Upper Arm   Position: Sitting   Pulse: 96   Temp: 97.8 °F (36.6 °C)   TempSrc: Temporal   SpO2: 93%   Weight: 260 lb (117.9 kg)        Body mass index is 43.27 kg/m². Patient is obese    No orders of the defined types were placed in this encounter. EXAM   Physical Exam  Vitals signs and nursing note reviewed. Constitutional:       Appearance: Normal appearance. She is well-developed. She is obese. HENT:      Head: Normocephalic. Right Ear: Tympanic membrane, ear canal and external ear normal.      Left Ear: Tympanic membrane, ear canal and external ear normal.      Nose: Nose normal.      Mouth/Throat:      Pharynx: Oropharynx is clear. Eyes:      General: No scleral icterus.      Conjunctiva/sclera: Conjunctivae normal.   Neck: Musculoskeletal: Normal range of motion and neck supple. Thyroid: No thyroid mass or thyromegaly. Vascular: No JVD. Trachea: Trachea normal.   Cardiovascular:      Rate and Rhythm: Normal rate and regular rhythm. Pulses: Normal pulses. Heart sounds: Normal heart sounds. No murmur. No gallop. Pulmonary:      Effort: Pulmonary effort is normal.      Breath sounds: Normal breath sounds. No wheezing, rhonchi or rales. Abdominal:      General: Bowel sounds are normal. There is no distension. Palpations: Abdomen is soft. There is no mass. Tenderness: There is no abdominal tenderness. There is no guarding. Hernia: No hernia is present. Musculoskeletal: Normal range of motion. General: No swelling or tenderness. Right lower leg: No edema. Left lower leg: No edema. Comments: Some OA in knees   Lymphadenopathy:      Cervical: No cervical adenopathy. Skin:     General: Skin is warm and dry. Capillary Refill: Capillary refill takes less than 2 seconds. Findings: No rash. Neurological:      General: No focal deficit present. Mental Status: She is alert and oriented to person, place, and time. Sensory: No sensory deficit. Motor: No weakness or abnormal muscle tone. Coordination: Coordination normal.      Gait: Gait normal.   Psychiatric:         Mood and Affect: Mood normal.         Behavior: Behavior normal.           Ramone Gardner was seen today for hypertension, anxiety and numbness. Diagnoses and all orders for this visit:    Essential hypertension  -     carvedilol (COREG) 6.25 MG tablet; Take 1 tablet by mouth daily  Well controlled, no changes made  Anxiety  -     escitalopram (LEXAPRO) 10 MG tablet; Take 1 tablet by mouth daily  -     LORazepam (ATIVAN) 1 MG tablet; Once daily prn anxiety as rescue  [place] checked.  Likes job mostly  Pseudotumor cerebri  Appears to have resolved, doing well off meds  Other orders  - famciclovir (FAMVIR) 500 MG tablet; Take 1 tablet by mouth daily          No follow-ups on file. Electronically signed by Thelma Jarrell MD on 3/11/21 at 3:15 PM EST    I have personally reviewed and updated the chief complaint, HPI, Past Medical, Family and Social History, as well as the above Review of Systems.

## 2021-05-18 DIAGNOSIS — F41.9 ANXIETY: ICD-10-CM

## 2021-05-18 RX ORDER — LORAZEPAM 1 MG/1
TABLET ORAL
Qty: 20 TABLET | Refills: 1 | Status: SHIPPED
Start: 2021-05-18 | End: 2021-07-29 | Stop reason: SDUPTHER

## 2021-07-29 ENCOUNTER — OFFICE VISIT (OUTPATIENT)
Dept: FAMILY MEDICINE CLINIC | Age: 51
End: 2021-07-29
Payer: COMMERCIAL

## 2021-07-29 VITALS
SYSTOLIC BLOOD PRESSURE: 132 MMHG | TEMPERATURE: 97.4 F | BODY MASS INDEX: 46.76 KG/M2 | DIASTOLIC BLOOD PRESSURE: 82 MMHG | OXYGEN SATURATION: 95 % | HEART RATE: 104 BPM | WEIGHT: 281 LBS

## 2021-07-29 DIAGNOSIS — R53.83 FATIGUE, UNSPECIFIED TYPE: ICD-10-CM

## 2021-07-29 DIAGNOSIS — F41.9 ANXIETY: ICD-10-CM

## 2021-07-29 DIAGNOSIS — R63.5 WEIGHT GAIN: ICD-10-CM

## 2021-07-29 DIAGNOSIS — R06.09 DYSPNEA ON EXERTION: Primary | ICD-10-CM

## 2021-07-29 PROCEDURE — 99214 OFFICE O/P EST MOD 30 MIN: CPT | Performed by: FAMILY MEDICINE

## 2021-07-29 PROCEDURE — 93000 ELECTROCARDIOGRAM COMPLETE: CPT | Performed by: FAMILY MEDICINE

## 2021-07-29 RX ORDER — LORAZEPAM 1 MG/1
TABLET ORAL
Qty: 30 TABLET | Refills: 2 | Status: SHIPPED
Start: 2021-07-29 | End: 2022-02-02 | Stop reason: SDUPTHER

## 2021-07-29 ASSESSMENT — ENCOUNTER SYMPTOMS
COUGH: 0
CONSTIPATION: 0
BLOOD IN STOOL: 0
CHEST TIGHTNESS: 0
WHEEZING: 0
DIARRHEA: 0
PHOTOPHOBIA: 0
SHORTNESS OF BREATH: 1
EYE REDNESS: 0
ABDOMINAL PAIN: 0
EYES NEGATIVE: 1
BACK PAIN: 1
VOMITING: 0

## 2021-07-29 NOTE — PROGRESS NOTES
OFFICE NOTE    21  Name: Juanita Todd  U:   Sex:female   Age:50 y.o. SUBJECTIVE  Chief Complaint   Patient presents with    Hypertension    Anxiety    Edema    Shortness of Breath    Palpitations       HPI reports BERRY, weight gain, increase in anxiety over past 6-12 mos. Worried about her heart. Did not know father, mother  young of breast cancer. Review of Systems   Constitutional: Positive for fatigue. Negative for appetite change, fever and unexpected weight change. HENT: Negative for congestion, ear pain and postnasal drip. Eyes: Negative. Negative for photophobia, redness and visual disturbance. Respiratory: Positive for shortness of breath. Negative for cough, chest tightness and wheezing. Cardiovascular: Positive for palpitations. Negative for chest pain. Gastrointestinal: Negative for abdominal pain, blood in stool, constipation, diarrhea and vomiting. Endocrine: Negative for cold intolerance, polydipsia and polyuria. Genitourinary: Positive for urgency. Negative for dysuria and hematuria. Musculoskeletal: Positive for back pain. Negative for arthralgias, gait problem and joint swelling. Skin: Negative for rash and wound. Allergic/Immunologic: Negative for environmental allergies and food allergies. Neurological: Negative for dizziness, tremors, seizures, weakness, numbness and headaches. Hematological: Negative for adenopathy. Does not bruise/bleed easily. Psychiatric/Behavioral: Negative for behavioral problems, confusion, dysphoric mood and sleep disturbance. The patient is nervous/anxious. All other systems reviewed and are negative.            Current Outpatient Medications:     LORazepam (ATIVAN) 1 MG tablet, Once daily prn anxiety as rescue, Disp: 30 tablet, Rfl: 2    carvedilol (COREG) 6.25 MG tablet, Take 1 tablet by mouth daily, Disp: 30 tablet, Rfl: 5    escitalopram (LEXAPRO) 10 MG tablet, Take 1 tablet by mouth daily, Disp: 30 tablet, Rfl: 5    famciclovir (FAMVIR) 500 MG tablet, Take 1 tablet by mouth daily, Disp: 30 tablet, Rfl: 5    tretinoin (RETIN-A) 0.025 % cream, Apply topically nightly., Disp: 45 g, Rfl: 1    Multiple Vitamins-Minerals (THERAPEUTIC MULTIVITAMIN-MINERALS) tablet, Take 1 tablet by mouth daily LD 5-23-17, Disp: , Rfl:   Allergies   Allergen Reactions    Doxycycline Rash       Past Medical History:   Diagnosis Date    Pseudotumor cerebri      Past Surgical History:   Procedure Laterality Date    BACK SURGERY  11/2015    CHOLECYSTECTOMY, LAPAROSCOPIC  06/01/2017    ENDOMETRIAL ABLATION  12/2012     Family History   Problem Relation Age of Onset    Cancer Mother         breast    No Known Problems Father     Cancer Maternal Grandfather         prostate    No Known Problems Paternal Grandmother     No Known Problems Paternal Grandfather      Social History     Tobacco History     Smoking Status  Never Smoker    Smokeless Tobacco Use  Never Used          Alcohol History     Alcohol Use Status  Yes Comment  social          Drug Use     Drug Use Status  No          Sexual Activity     Sexually Active  Not Currently Partners  Male                OBJECTIVE  Vitals:    07/29/21 1507   BP: 132/82   Site: Right Upper Arm   Position: Sitting   Pulse: 104   Temp: 97.4 °F (36.3 °C)   TempSrc: Temporal   SpO2: 95%   Weight: 281 lb (127.5 kg)        Body mass index is 46.76 kg/m².     Orders Placed This Encounter   Procedures    CBC Auto Differential     Standing Status:   Future     Number of Occurrences:   1     Standing Expiration Date:   7/29/2022    Comprehensive Metabolic Panel     Standing Status:   Future     Number of Occurrences:   1     Standing Expiration Date:   7/29/2022    TSH without Reflex     Standing Status:   Future     Number of Occurrences:   1     Standing Expiration Date:   7/29/2022    T4, Free     Standing Status:   Future     Number of Occurrences:   1     Standing Expiration Date: Skin is not jaundiced. Findings: No bruising or rash. Neurological:      General: No focal deficit present. Mental Status: She is alert and oriented to person, place, and time. Sensory: No sensory deficit. Motor: No weakness or abnormal muscle tone. Coordination: Coordination normal.      Gait: Gait normal.   Psychiatric:         Mood and Affect: Mood normal.         Behavior: Behavior normal.           [facility] was [facility] for hypertension, anxiety, edema, shortness of breath and palpitations. Diagnoses and all orders for this visit:    Dyspnea on exertion  -     CBC Auto Differential; Future  -     Comprehensive Metabolic Panel; Future  -     TSH without Reflex; Future  -     T4, Free; Future  -     EKG 12 Lead; Future  -     EKG 12 Lead  EKG looks normal  Weight gain  -     CBC Auto Differential; Future  -     Comprehensive Metabolic Panel; Future  -     TSH without Reflex; Future  -     T4, Free; Future  -     EKG 12 Lead; Future  -     Sedimentation Rate; Future  -     Hemoglobin A1C; Future  -     EKG 12 Lead   Suspect hypothyroidism. Discussed referral to Bariatrics program if work up negative  Fatigue, unspecified type  -     CBC Auto Differential; Future  -     Comprehensive Metabolic Panel; Future  -     EKG 12 Lead; Future  -     Sedimentation Rate; Future  -     Hemoglobin A1C; Future  -     EKG 12 Lead  BW ordered. Anxiety  -     LORazepam (ATIVAN) 1 MG tablet; Once daily prn anxiety as rescue   thinks some of her problem may be nerves        No follow-ups on file.     Electronically signed by Miracle Mansfield MD on 7/29/21 at 4:11 PM EDT

## 2021-08-02 ENCOUNTER — TELEPHONE (OUTPATIENT)
Dept: FAMILY MEDICINE CLINIC | Age: 51
End: 2021-08-02

## 2021-08-02 DIAGNOSIS — R06.09 DYSPNEA ON EXERTION: Primary | ICD-10-CM

## 2021-08-02 NOTE — TELEPHONE ENCOUNTER
----- Message from Rodolfo Reardon LPN sent at 4/8/7995  8:32 AM EDT -----  Patient informed and would like referral of your choice in [place].

## 2021-08-05 ENCOUNTER — OFFICE VISIT (OUTPATIENT)
Dept: CARDIOLOGY CLINIC | Age: 51
End: 2021-08-05
Payer: COMMERCIAL

## 2021-08-05 VITALS
RESPIRATION RATE: 18 BRPM | SYSTOLIC BLOOD PRESSURE: 128 MMHG | HEIGHT: 65 IN | HEART RATE: 70 BPM | DIASTOLIC BLOOD PRESSURE: 76 MMHG | WEIGHT: 282 LBS | BODY MASS INDEX: 46.98 KG/M2

## 2021-08-05 DIAGNOSIS — I10 ESSENTIAL HYPERTENSION: Primary | ICD-10-CM

## 2021-08-05 DIAGNOSIS — G47.33 OSA ON CPAP: ICD-10-CM

## 2021-08-05 DIAGNOSIS — R06.02 SHORTNESS OF BREATH: ICD-10-CM

## 2021-08-05 DIAGNOSIS — Z99.89 OSA ON CPAP: ICD-10-CM

## 2021-08-05 DIAGNOSIS — R00.2 HEART PALPITATIONS: ICD-10-CM

## 2021-08-05 PROCEDURE — 99204 OFFICE O/P NEW MOD 45 MIN: CPT | Performed by: INTERNAL MEDICINE

## 2021-08-05 PROCEDURE — 93000 ELECTROCARDIOGRAM COMPLETE: CPT | Performed by: INTERNAL MEDICINE

## 2021-08-05 RX ORDER — UREA 10 %
100 LOTION (ML) TOPICAL DAILY
COMMUNITY

## 2021-08-05 RX ORDER — CARVEDILOL 6.25 MG/1
3.12 TABLET ORAL DAILY
Qty: 30 TABLET | Refills: 5 | Status: SHIPPED
Start: 2021-08-05 | End: 2021-08-20 | Stop reason: DRUGHIGH

## 2021-08-05 RX ORDER — DOCUSATE SODIUM 100 MG/1
100 CAPSULE, LIQUID FILLED ORAL DAILY
COMMUNITY

## 2021-08-05 RX ORDER — MULTIVITAMIN WITH IRON
250 TABLET ORAL DAILY
COMMUNITY

## 2021-08-05 NOTE — PROGRESS NOTES
OFFICE VISIT     PRIMARY CARE PHYSICIAN:      Jose G Tran MD       ALLERGIES / SENSITIVITIES:        Allergies   Allergen Reactions    Doxycycline Rash          REVIEWED MEDICATIONS:        Current Outpatient Medications:     carvedilol (COREG) 6.25 MG tablet, Take 0.5 tablets by mouth 2 times daily, Disp: 30 tablet, Rfl: 5    magnesium (MAGNESIUM-OXIDE) 250 MG TABS tablet, Take 250 mg by mouth daily, Disp: , Rfl:     Potassium 99 MG TABS, Take by mouth, Disp: , Rfl:     Biotin 5000 MCG CAPS, Take by mouth, Disp: , Rfl:     Biotin w/ Vitamins C & E (HAIR/SKIN/NAILS PO), Take 1 tablet by mouth 3 times daily, Disp: , Rfl:     vitamin B-12 (CYANOCOBALAMIN) 100 MCG tablet, Take 100 mcg by mouth daily, Disp: , Rfl:     docusate sodium (STOOL SOFTENER) 100 MG capsule, Take 100 mg by mouth daily, Disp: , Rfl:     LORazepam (ATIVAN) 1 MG tablet, Once daily prn anxiety as rescue, Disp: 30 tablet, Rfl: 2    escitalopram (LEXAPRO) 10 MG tablet, Take 1 tablet by mouth daily, Disp: 30 tablet, Rfl: 5    famciclovir (FAMVIR) 500 MG tablet, Take 1 tablet by mouth daily, Disp: 30 tablet, Rfl: 5    tretinoin (RETIN-A) 0.025 % cream, Apply topically nightly., Disp: 45 g, Rfl: 1    Multiple Vitamins-Minerals (THERAPEUTIC MULTIVITAMIN-MINERALS) tablet, Take 1 tablet by mouth daily LD 5-23-17, Disp: , Rfl:       S: REASON FOR VISIT:       Chief Complaint   Patient presents with    New Patient     Fatigued, Dizziness, palpitations, SOB, referral Dr. Sachin Charles          History of Present Illness:       New consult for heart palpitations    53 yr old [facility] with history of HTN, CHARISMA referred for heart palpitations   No hospitalizations or surgeries since last visit   Patient is compliant with all medications - Taking Coreg ONCE daily   Pedro any exertional chest pain or short of breath   C/o occ heart 'skip, and racing, no associated dizzy or syncope   C/o occ dizzy. No syncope.    Active at home   No orthopnea   Try to watch diet          Past Medical History:   Diagnosis Date    Pseudotumor cerebri             Past Surgical History:   Procedure Laterality Date    BACK SURGERY  11/2015    CHOLECYSTECTOMY, LAPAROSCOPIC  06/01/2017    ENDOMETRIAL ABLATION  12/2012          Family History   Problem Relation Age of Onset    Cancer Mother         breast    No Known Problems Father     Cancer Maternal Grandfather         prostate    No Known Problems Paternal Grandmother     No Known Problems Paternal Grandfather           Social History     Tobacco Use    Smoking status: Never Smoker    Smokeless tobacco: Never Used   Substance Use Topics    Alcohol use: Yes     Comment: social         Review of Systems:  Constitutional: negative for fever and chills, or significant weight loss  HEENT: negative for acute visual symptoms or auditory problems, no dysphagia  Respiratory: negative for cough, wheezing, or hemoptysis  Cardiovascular: negative for chest pain, +ve palpitations, and dyspnea  Gastrointestinal: negative for abdominal pain, diarrhea, nausea and vomiting  Endocrine: Negative for polyuria and polydyspsia  Genitourinary:negative for dysuria and hematuria  Derm: negative for rash and skin lesion(s)  Neurological: negative for tingling, numbness, weakness, seizures and tremors  Endocrine: negative for polydipsia and polyuria  Musculoskeletal: negative for pain or tenderness  Psychiatric: negative for anxiety, depression, or suicidal ideations         O:  COMPLETE PHYSICAL EXAM:       /76   Pulse 70   Resp 18   Ht 5' 5\" (1.651 m)   Wt 282 lb (127.9 kg)   BMI 46.93 kg/m²       General:   Patient alert, comfortable, no distress. Appears stated age. HEENT:    Pupils equal, no icterus, no nasal drainage, tongue moist.   Neck:              No masses, Thyroid not palpable. No elevated JVD, No carotid bruit. Chest:   Normal configuration, non tender.    Lungs:   Clear to auscultation bilaterally, few scattered rhonchi. Cardiovascular:  Regular rhythm, 1/6 systolic murmur, No S3,  no palpable thrills,    Abdomen:  Soft,  Obese, Bowel sounds normal, no pulsatile abdominal aorta. Extremities:  No edema. Distal pulses palpable. No cyanosis, no clubbing. Skin:   Good turgor, warm and dry, no cyanosis. Musculoskeletal: No joint swelling or deformity. Neuro:   Cranial nerves grossly intact; No focal neurologic deficit. Psych:   Alert, good mood and effect. REVIEW OF DIAGNOSTIC TESTS:        Electrocardiogram: NSR, Normal QTc interval   Labs: TSH, CBC, BMP - Ok 7/29/21          A/P:   ASSESSMENT / PLAN:    Angelina Vo was seen today for new patient. Diagnoses and all orders for this visit:    Essential hypertension - Controlled on current meds  -     EKG 12 lead  -     Echo 2D w doppler w color complete; Future  -     carvedilol (COREG) 6.25 MG tablet; Take 0.5 tablet by mouth TWICE daily    Shortness of breath - No acute CHF  -     Echo 2D w doppler w color complete; Future    Heart palpitations - Avoid caffeine, Continue Coreg, take 1/2 tab TWICE daily-TSH Ok   -     Cardiac event monitor; Future  -     Echo 2D w doppler w color complete; Future    CHARISMA on CPAP - Advised to use CPAP regularly  -     Echo 2D w doppler w color complete; Future     Preventive Cardiology: Low cholesterol diet, regular exercise as tolerate, and gradual weight loss discussed. Monitor BP and heart rates. Above recommendations discussed with her. All questions answered about cardiac diagnoses and cardiac medications. Continue current medications. Compliance with medications and f/u with all physicians discussed. Risk factor modification based on risk profile discussed. Call if any exertional chest pain, short of breath, dizzy or palpitations   Follow up after tests or earlier if needed.          [facility]  [address], [place], [address] [facility]  [phone]

## 2021-08-19 ENCOUNTER — HOSPITAL ENCOUNTER (OUTPATIENT)
Dept: HOSPITAL 83 - CARD | Age: 51
Discharge: HOME | End: 2021-08-19
Attending: INTERNAL MEDICINE
Payer: COMMERCIAL

## 2021-08-19 DIAGNOSIS — I10: Primary | ICD-10-CM

## 2021-08-19 DIAGNOSIS — G47.33: ICD-10-CM

## 2021-08-19 DIAGNOSIS — R06.02: ICD-10-CM

## 2021-08-19 DIAGNOSIS — Z79.899: ICD-10-CM

## 2021-08-19 DIAGNOSIS — R00.2: ICD-10-CM

## 2021-08-19 LAB
LEFT VENTRICULAR EJECTION FRACTION HIGH VALUE: NORMAL
LEFT VENTRICULAR EJECTION FRACTION MODE: NORMAL
LV EF: NORMAL %

## 2021-08-20 RX ORDER — CARVEDILOL 6.25 MG/1
3.12 TABLET ORAL 2 TIMES DAILY
Qty: 30 TABLET | Refills: 5 | Status: SHIPPED
Start: 2021-08-20 | End: 2021-09-30 | Stop reason: SDUPTHER

## 2021-08-24 DIAGNOSIS — I10 ESSENTIAL HYPERTENSION: ICD-10-CM

## 2021-08-24 DIAGNOSIS — R00.2 HEART PALPITATIONS: ICD-10-CM

## 2021-08-24 DIAGNOSIS — G47.33 OSA ON CPAP: ICD-10-CM

## 2021-08-24 DIAGNOSIS — R06.02 SHORTNESS OF BREATH: ICD-10-CM

## 2021-08-24 DIAGNOSIS — Z99.89 OSA ON CPAP: ICD-10-CM

## 2021-09-17 ENCOUNTER — OFFICE VISIT (OUTPATIENT)
Dept: FAMILY MEDICINE CLINIC | Age: 51
End: 2021-09-17
Payer: COMMERCIAL

## 2021-09-17 VITALS
HEART RATE: 102 BPM | TEMPERATURE: 97.2 F | SYSTOLIC BLOOD PRESSURE: 118 MMHG | DIASTOLIC BLOOD PRESSURE: 84 MMHG | OXYGEN SATURATION: 95 % | BODY MASS INDEX: 46.1 KG/M2 | WEIGHT: 277 LBS

## 2021-09-17 DIAGNOSIS — F41.9 ANXIETY: ICD-10-CM

## 2021-09-17 DIAGNOSIS — I10 ESSENTIAL HYPERTENSION: Primary | ICD-10-CM

## 2021-09-17 DIAGNOSIS — E66.01 CLASS 3 SEVERE OBESITY DUE TO EXCESS CALORIES WITHOUT SERIOUS COMORBIDITY WITH BODY MASS INDEX (BMI) OF 45.0 TO 49.9 IN ADULT (HCC): ICD-10-CM

## 2021-09-17 DIAGNOSIS — G93.2 PSEUDOTUMOR CEREBRI: ICD-10-CM

## 2021-09-17 PROCEDURE — 99214 OFFICE O/P EST MOD 30 MIN: CPT | Performed by: FAMILY MEDICINE

## 2021-09-17 RX ORDER — ESCITALOPRAM OXALATE 10 MG/1
10 TABLET ORAL DAILY
Qty: 30 TABLET | Refills: 5 | Status: SHIPPED
Start: 2021-09-17 | End: 2022-03-18 | Stop reason: SDUPTHER

## 2021-09-17 RX ORDER — FAMCICLOVIR 500 MG/1
500 TABLET, FILM COATED ORAL DAILY
Qty: 30 TABLET | Refills: 5 | Status: SHIPPED
Start: 2021-09-17 | End: 2022-03-18 | Stop reason: SDUPTHER

## 2021-09-17 ASSESSMENT — ENCOUNTER SYMPTOMS
BLOOD IN STOOL: 0
DIARRHEA: 0
WHEEZING: 0
ABDOMINAL PAIN: 0
SHORTNESS OF BREATH: 0
VOMITING: 0
CONSTIPATION: 0
CHEST TIGHTNESS: 0
EYES NEGATIVE: 1
COUGH: 0
PHOTOPHOBIA: 0
EYE REDNESS: 0

## 2021-09-17 NOTE — PROGRESS NOTES
OFFICE NOTE    9/17/21  Name: Yanelis Jenkins  UGD:[id number]   Sex:female   Age:50 y.o. 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 Upset her but has already had many offers  Review of Systems   Constitutional: Positive for fatigue. Negative for appetite change, fever and unexpected weight change. HENT: Negative for congestion, ear pain and postnasal drip. Eyes: Negative. Negative for photophobia, redness and visual disturbance. Respiratory: Negative for cough, chest tightness, shortness of breath and wheezing. Cardiovascular: Negative for chest pain and palpitations. Gastrointestinal: Negative for abdominal pain, blood in stool, constipation, diarrhea and vomiting. Endocrine: Negative for cold intolerance, polydipsia and polyuria. Genitourinary: Negative for dysuria, hematuria and urgency. Musculoskeletal: Positive for arthralgias. Negative for gait problem and joint swelling. Skin: Negative for rash and wound. Allergic/Immunologic: Negative for environmental allergies and food allergies. Neurological: Negative for dizziness, tremors, seizures, weakness, numbness and headaches. Hematological: Negative for adenopathy. Does not bruise/bleed easily. Psychiatric/Behavioral: Negative for behavioral problems, confusion, dysphoric mood and sleep disturbance. The patient is nervous/anxious. All other systems reviewed and are negative.            Current Outpatient Medications:     escitalopram (LEXAPRO) 10 MG tablet, Take 1 tablet by mouth daily, Disp: 30 tablet, Rfl: 5    famciclovir (FAMVIR) 500 MG tablet, Take 1 tablet by mouth daily, Disp: 30 tablet, Rfl: 5    carvedilol (COREG) 6.25 MG tablet, Take 0.5 tablets by mouth 2 times daily, Disp: 30 tablet, Rfl: 5    magnesium (MAGNESIUM-OXIDE) 250 MG TABS tablet, Take 250 mg by mouth daily, Disp: , Rfl:     Potassium 99 MG TABS, Take by mouth, Disp: , Rfl:     Biotin 5000 MCG CAPS, Take by mouth, Disp: , Rfl:     Biotin w/ Vitamins C & E (HAIR/SKIN/NAILS PO), Take 1 tablet by mouth 3 times daily, Disp: , Rfl:     vitamin B-12 (CYANOCOBALAMIN) 100 MCG tablet, Take 100 mcg by mouth daily, Disp: , Rfl:     docusate sodium (STOOL SOFTENER) 100 MG capsule, Take 100 mg by mouth daily, Disp: , Rfl:     LORazepam (ATIVAN) 1 MG tablet, Once daily prn anxiety as rescue, Disp: 30 tablet, Rfl: 2    tretinoin (RETIN-A) 0.025 % cream, Apply topically nightly., Disp: 45 g, Rfl: 1    Multiple Vitamins-Minerals (THERAPEUTIC MULTIVITAMIN-MINERALS) tablet, Take 1 tablet by mouth daily LD 5-23-17, Disp: , Rfl:   Allergies   Allergen Reactions    Doxycycline Rash       Past Medical History:   Diagnosis Date    Pseudotumor cerebri      Past Surgical History:   Procedure Laterality Date    BACK SURGERY  11/2015    CHOLECYSTECTOMY, LAPAROSCOPIC  06/01/2017    ENDOMETRIAL ABLATION  12/2012     Family History   Problem Relation Age of Onset    Cancer Mother         breast    No Known Problems Father     Cancer Maternal Grandfather         prostate    No Known Problems Paternal Grandmother     No Known Problems Paternal Grandfather      Social History     Tobacco History     Smoking Status  Never Smoker    Smokeless Tobacco Use  Never Used          Alcohol History     Alcohol Use Status  Yes Comment  social          Drug Use     Drug Use Status  No          Sexual Activity     Sexually Active  Not Currently Partners  Male                OBJECTIVE  Vitals:    09/17/21 0900   BP: 118/84   Site: Left Upper Arm   Position: Sitting   Pulse: 102   Temp: 97.2 °F (36.2 °C)   TempSrc: Temporal   SpO2: 95%   Weight: 277 lb (125.6 kg)        Body mass index is 46.1 kg/m². No orders of the defined types were placed in this encounter. EXAM   Physical Exam  Vitals and nursing note reviewed. Constitutional:       Appearance: Normal appearance. She is well-developed. She is obese. HENT:      Right Ear: Tympanic membrane, ear canal and external ear normal.      Left Ear: Tympanic membrane, ear canal and external ear normal.      Nose: Nose normal.      Mouth/Throat:      Pharynx: Oropharynx is clear. No posterior oropharyngeal erythema. Eyes:      General: No scleral icterus. Conjunctiva/sclera: Conjunctivae normal.      Pupils: Pupils are equal, round, and reactive to light. Neck:      Thyroid: No thyroid mass or thyromegaly. Vascular: No JVD. Trachea: Trachea normal.   Cardiovascular:      Rate and Rhythm: Normal rate and regular rhythm. Pulses: Normal pulses. Heart sounds: Normal heart sounds. No murmur heard. No gallop. Pulmonary:      Effort: Pulmonary effort is normal.      Breath sounds: Normal breath sounds. No wheezing, rhonchi or rales. Abdominal:      General: Bowel sounds are normal. There is no distension. Palpations: Abdomen is soft. There is no mass. Tenderness: There is no abdominal tenderness. There is no guarding. Hernia: No hernia is present. Musculoskeletal:         General: No swelling or tenderness. Normal range of motion. Cervical back: Normal range of motion and neck supple. Right lower leg: No edema. Left lower leg: No edema. Lymphadenopathy:      Cervical: No cervical adenopathy. Skin:     General: Skin is warm and dry. Coloration: Skin is not jaundiced. Findings: No bruising or rash. Neurological:      General: No focal deficit present. Mental Status: She is alert and oriented to person, place, and time. Sensory: No sensory deficit. Motor: No weakness or abnormal muscle tone. Coordination: Coordination normal.      Gait: Gait normal.   Psychiatric:         Mood and Affect: Mood normal.         Behavior: Behavior normal.           Mar Cruz was seen today for anxiety.     Diagnoses and all orders for this visit:    Essential hypertension  Well controlled, no changes made  Anxiety  -     escitalopram (LEXAPRO) 10 MG tablet; Take 1 tablet by mouth daily  Feels in control. No changes were made  Pseudotumor cerebri  Could probably consider this resolved/ denies eye issues or headaches. Class 3 severe obesity due to excess calories without serious comorbidity with body mass index (BMI) of 45.0 to 49.9 in adult [facility])  Understands and will work on this. Other orders  -     famciclovir (FAMVIR) 500 MG tablet; Take 1 tablet by mouth daily          Return in about 6 months (around 3/17/2022).     Electronically signed by Pauline Meckel, MD on 9/17/21 at 9:24 AM EDT

## 2021-09-30 ENCOUNTER — OFFICE VISIT (OUTPATIENT)
Dept: CARDIOLOGY CLINIC | Age: 51
End: 2021-09-30
Payer: COMMERCIAL

## 2021-09-30 ENCOUNTER — HOSPITAL ENCOUNTER (OUTPATIENT)
Dept: HOSPITAL 83 - RAD | Age: 51
Discharge: HOME | End: 2021-09-30
Attending: CHIROPRACTOR
Payer: COMMERCIAL

## 2021-09-30 VITALS
SYSTOLIC BLOOD PRESSURE: 122 MMHG | RESPIRATION RATE: 18 BRPM | WEIGHT: 283 LBS | HEIGHT: 65 IN | BODY MASS INDEX: 47.15 KG/M2 | DIASTOLIC BLOOD PRESSURE: 74 MMHG | HEART RATE: 82 BPM

## 2021-09-30 DIAGNOSIS — Z99.89 OSA ON CPAP: ICD-10-CM

## 2021-09-30 DIAGNOSIS — I10 ESSENTIAL HYPERTENSION: Primary | ICD-10-CM

## 2021-09-30 DIAGNOSIS — M47.812: Primary | ICD-10-CM

## 2021-09-30 DIAGNOSIS — G47.33 OSA ON CPAP: ICD-10-CM

## 2021-09-30 DIAGNOSIS — I10 ESSENTIAL HYPERTENSION: ICD-10-CM

## 2021-09-30 DIAGNOSIS — M48.061: ICD-10-CM

## 2021-09-30 DIAGNOSIS — R00.2 HEART PALPITATIONS: ICD-10-CM

## 2021-09-30 DIAGNOSIS — R00.0 WIDE-COMPLEX TACHYCARDIA: ICD-10-CM

## 2021-09-30 PROCEDURE — 99213 OFFICE O/P EST LOW 20 MIN: CPT | Performed by: INTERNAL MEDICINE

## 2021-09-30 PROCEDURE — 93000 ELECTROCARDIOGRAM COMPLETE: CPT | Performed by: INTERNAL MEDICINE

## 2021-09-30 RX ORDER — CARVEDILOL 3.12 MG/1
3.12 TABLET ORAL 2 TIMES DAILY
Qty: 60 TABLET | Refills: 6 | Status: SHIPPED
Start: 2021-09-30 | End: 2022-03-18 | Stop reason: SDUPTHER

## 2021-09-30 NOTE — PROGRESS NOTES
History:   Diagnosis Date    Pseudotumor cerebri             Past Surgical History:   Procedure Laterality Date    BACK SURGERY  11/2015    CHOLECYSTECTOMY, LAPAROSCOPIC  06/01/2017    ENDOMETRIAL ABLATION  12/2012          Family History   Problem Relation Age of Onset    Cancer Mother         breast    No Known Problems Father     Cancer Maternal Grandfather         prostate    No Known Problems Paternal Grandmother     No Known Problems Paternal Grandfather           Social History     Tobacco Use    Smoking status: Never Smoker    Smokeless tobacco: Never Used   Substance Use Topics    Alcohol use: Yes     Comment: social         Review of Systems:  Constitutional: negative for fever and chills, or significant weight loss  HEENT: negative for acute visual symptoms or auditory problems, no dysphagia  Respiratory: negative for cough, wheezing, or hemoptysis  Cardiovascular: negative for chest pain, palpitations, and dyspnea  Gastrointestinal: negative for abdominal pain, diarrhea, nausea and vomiting  Endocrine: Negative for polyuria and polydyspsia  Genitourinary:negative for dysuria and hematuria  Derm: negative for rash and skin lesion(s)  Neurological: negative for tingling, numbness, weakness, seizures and tremors  Endocrine: negative for polydipsia and polyuria  Musculoskeletal: negative for pain or tenderness  Psychiatric: negative for anxiety, depression, or suicidal ideations         O:  COMPLETE PHYSICAL EXAM:       /74   Pulse 82   Resp 18   Ht 5' 5\" (1.651 m)   Wt 283 lb (128.4 kg)   BMI 47.09 kg/m²       General:   Patient alert, comfortable, no distress. Appears stated age. HEENT:    Pupils equal, no icterus, tongue moist.   Neck:              No masses, Thyroid not palpable. No elevated JVD, No carotid bruit. Chest:   Normal configuration   Lungs:   Clear to auscultation bilaterally, few scattered rhonchi.    Cardiovascular:  Regular rhythm, 1/6 systolic murmur, No S3 Abdomen: Bowel sounds normal, Obese   Extremities:  No edema. Distal pulses palpable. No cyanosis, no clubbing. Skin:   Good turgor, warm and dry, no cyanosis. Musculoskeletal: No joint swelling or deformity. Neuro:   Cranial nerves grossly intact; No focal neurologic deficit. Psych:   Alert, good mood and effect. REVIEW OF DIAGNOSTIC TESTS:        Electrocardiogram: Reviewed   2D Echo: 8/5/2021 - EF 60-65%, Normal RV function   Event monitor - 4 beat WCT 8/21/21 at 6:38am           A/P:   ASSESSMENT / PLAN:    Destini John was seen today for follow-up. Diagnoses and all orders for this visit:      Wide-complex tachycardia ([address] - Breif, 4 beats, asymptomatic, Continue BB, Normal LV EF.  -     EKG 12 lead    Essential hypertension - Controlled on current meds  -     EKG 12 lead     Shortness of breath - No acute CHF - Echo showed normal EF.      Heart palpitations -  Recent TSH Ok ; Avoid caffeine, Continue Coreg, take Extra 1/2 tab as needed for HR >120     CHARISMA on CPAP - Advised to use CPAP regularly     Morbid obesity, BMI 47 - Diet, exercise and weight loss discussed. Preventive Cardiology: Low cholesterol diet, regular exercise as tolerate, and gradual weight loss discussed. Monitor BP and heart rates. Test results and above recommendations discussed with her and her . All questions answered about cardiac diagnoses and cardiac medications. Continue current medications. Compliance with medications and f/u with all physicians discussed. Risk factor modification based on risk profile discussed. Call if any exertional chest pain, short of breath, dizzy or palpitations   Follow up in 6 months or earlier if needed.          [facility]  [address], [place], [address] [facility]  [phone]

## 2022-01-11 ENCOUNTER — OFFICE VISIT (OUTPATIENT)
Dept: FAMILY MEDICINE CLINIC | Age: 52
End: 2022-01-11
Payer: COMMERCIAL

## 2022-01-11 VITALS
SYSTOLIC BLOOD PRESSURE: 124 MMHG | HEART RATE: 75 BPM | HEIGHT: 65 IN | OXYGEN SATURATION: 97 % | RESPIRATION RATE: 18 BRPM | WEIGHT: 273 LBS | BODY MASS INDEX: 45.48 KG/M2 | TEMPERATURE: 98.1 F | DIASTOLIC BLOOD PRESSURE: 74 MMHG

## 2022-01-11 DIAGNOSIS — J06.9 UPPER RESPIRATORY TRACT INFECTION, UNSPECIFIED TYPE: Primary | ICD-10-CM

## 2022-01-11 LAB
Lab: NORMAL
PERFORMING INSTRUMENT: NORMAL
QC PASS/FAIL: NORMAL
SARS-COV-2, POC: NORMAL

## 2022-01-11 PROCEDURE — 99213 OFFICE O/P EST LOW 20 MIN: CPT | Performed by: PHYSICIAN ASSISTANT

## 2022-01-11 PROCEDURE — 1036F TOBACCO NON-USER: CPT | Performed by: PHYSICIAN ASSISTANT

## 2022-01-11 PROCEDURE — 87426 SARSCOV CORONAVIRUS AG IA: CPT | Performed by: PHYSICIAN ASSISTANT

## 2022-01-11 PROCEDURE — G8417 CALC BMI ABV UP PARAM F/U: HCPCS | Performed by: PHYSICIAN ASSISTANT

## 2022-01-11 PROCEDURE — 3017F COLORECTAL CA SCREEN DOC REV: CPT | Performed by: PHYSICIAN ASSISTANT

## 2022-01-11 PROCEDURE — G8427 DOCREV CUR MEDS BY ELIG CLIN: HCPCS | Performed by: PHYSICIAN ASSISTANT

## 2022-01-11 PROCEDURE — G8484 FLU IMMUNIZE NO ADMIN: HCPCS | Performed by: PHYSICIAN ASSISTANT

## 2022-01-11 NOTE — PROGRESS NOTES
Chief Complaint   Headache (onset x five days ), Fatigue, Ear Fullness, and Cough    History of Present Illness   Source of history provided by: patient. Rosario Veras is a 49 y.o. old female who has a past medical history of:   Past Medical History:   Diagnosis Date    Pseudotumor cerebri     Presents to the walk in clinic for evaluation of headache, body aches, fatigue, ear fullness, and cough x 5 days. Denies any CP, dyspnea, LE edema, abdominal pain, vomiting, rash, or lethargy. Denies fever, chills, or loss of taste/smell . Has been taking Motrin without symptomatic relief. Denies known COVID exposure. Denies any hx of asthma or COPD. Denies hx of tobacco use. Denies COVID vaccinations. ROS   Pertinent positives and negatives are stated within HPI, all other systems reviewed and are negative. Surgical History:  has a past surgical history that includes back surgery (11/2015); Endometrial ablation (12/2012); and Cholecystectomy, laparoscopic (06/01/2017). Social History:  reports that she has never smoked. She has never used smokeless tobacco. She reports current alcohol use. She reports that she does not use drugs. Family History: family history includes Cancer in her maternal grandfather and mother; No Known Problems in her father, paternal grandfather, and paternal grandmother. Allergies: Doxycycline    Physical Exam      VS:  /74   Pulse 75   Temp 98.1 °F (36.7 °C)   Resp 18   Ht 5' 5\" (1.651 m)   Wt 273 lb (123.8 kg)   SpO2 97%   BMI 45.43 kg/m²    Oxygen Saturation Interpretation: Normal.    Constitutional:  Alert, development consistent with age. NAD. Head:  NC/NT. Airway patent. Ears: TMs normal bilaterally. Canals without exudate or swelling bilaterally. Mouth: Posterior pharynx with mild erythema and clear postnasal drip. No tonsillar hypertrophy or exudate. Neck:  Normal ROM. Supple. No anterior cervical adenopathy noted.   Lungs: CTAB without wheezes, rales, or rhonchi. CV:  Regular rate and rhythm, normal heart sounds, without pathological murmurs, ectopy, gallops, or rubs. Skin:  Normal turgor. Warm, dry, without visible rash. Lymphatic: No lymphangitis or adenopathy noted. Neurological:  Oriented. Motor functions intact. Lab / Imaging Results   (All laboratory and radiology results have been personally reviewed by myself)  Labs:  Results for orders placed or performed in visit on 01/11/22   POCT COVID-19, Antigen   Result Value Ref Range    SARS-COV-2, POC Not-Detected Not Detected    Lot Number [id number]     QC Pass/Fail pass     Performing Instrument BD Veritor        Imaging: All Radiology results interpreted by Radiologist unless otherwise noted. No results found. Medical Decision Making   Pt non-toxic, in no apparent distress and stable at time of discharge. Assessment/Plan   Abel Paige was seen today for headache, fatigue, ear fullness and cough. Diagnoses and all orders for this visit:    Upper respiratory tract infection, unspecified type  -     POCT COVID-19, Antigen  -     COVID-19 Ambulatory; Future    Rapid COVID negative. COVID-19 swab obtained and pending, will call with results once available. Advised self-quarantine based on CDC guidelines. Pt should also be fever free for 24 hours and symptoms should be improved overall prior to returning to public. Increase fluids and rest. Symptomatic relief discussed including Tylenol prn pain/fever. Schedule f/u with PCP in 7-10 days if symptoms persist. ED sooner if symptoms worsen or change. ED immediately with high or refractory fever, progressive SOB, dyspnea, CP, calf pain/swelling, shaking chills, vomiting, abdominal pain, lethargy, flank pain, or decreased urinary output. Pt verbalizes understanding and is in agreement with plan of care. All questions answered.     Shalonda Jacob PA-C

## 2022-01-14 ENCOUNTER — TELEPHONE (OUTPATIENT)
Dept: FAMILY MEDICINE CLINIC | Age: 52
End: 2022-01-14

## 2022-01-14 DIAGNOSIS — J01.90 ACUTE BACTERIAL SINUSITIS: Primary | ICD-10-CM

## 2022-01-14 DIAGNOSIS — B96.89 ACUTE BACTERIAL SINUSITIS: Primary | ICD-10-CM

## 2022-01-14 RX ORDER — AZITHROMYCIN 250 MG/1
250 TABLET, FILM COATED ORAL SEE ADMIN INSTRUCTIONS
Qty: 6 TABLET | Refills: 0 | Status: SHIPPED | OUTPATIENT
Start: 2022-01-14 | End: 2022-01-19

## 2022-01-14 NOTE — TELEPHONE ENCOUNTER
Patient covid test came back and was negative. Is asking if she would be able to get ATB to see if she could feel better. Possibly a z-hank. Please advise.

## 2022-02-02 ENCOUNTER — PATIENT MESSAGE (OUTPATIENT)
Dept: FAMILY MEDICINE CLINIC | Age: 52
End: 2022-02-02

## 2022-02-02 DIAGNOSIS — F41.9 ANXIETY: ICD-10-CM

## 2022-02-02 RX ORDER — LORAZEPAM 1 MG/1
TABLET ORAL
Qty: 30 TABLET | Refills: 2 | Status: SHIPPED
Start: 2022-02-02 | End: 2022-03-18 | Stop reason: SDUPTHER

## 2022-02-02 NOTE — TELEPHONE ENCOUNTER
Last Appointment:  9/17/2021  Future Appointments   Date Time Provider Andre Salazar   3/18/2022  8:00 AM Tammie Joyner  [address] [place] [address]

## 2022-02-02 NOTE — TELEPHONE ENCOUNTER
From: Rolando Montaño  To: Dr. Edwige Pizano: 2/2/2022 7:26 AM EST  Subject: Ativan refill    Could I get another prescription of Ativan called into [address] [place] [address]?  Thank you

## 2022-03-18 ENCOUNTER — OFFICE VISIT (OUTPATIENT)
Dept: FAMILY MEDICINE CLINIC | Age: 52
End: 2022-03-18
Payer: COMMERCIAL

## 2022-03-18 VITALS
BODY MASS INDEX: 47.03 KG/M2 | TEMPERATURE: 96.4 F | DIASTOLIC BLOOD PRESSURE: 78 MMHG | OXYGEN SATURATION: 94 % | SYSTOLIC BLOOD PRESSURE: 130 MMHG | HEART RATE: 112 BPM | WEIGHT: 282.6 LBS

## 2022-03-18 DIAGNOSIS — I10 ESSENTIAL HYPERTENSION: ICD-10-CM

## 2022-03-18 DIAGNOSIS — F41.9 ANXIETY: ICD-10-CM

## 2022-03-18 PROCEDURE — 3017F COLORECTAL CA SCREEN DOC REV: CPT | Performed by: FAMILY MEDICINE

## 2022-03-18 PROCEDURE — 99214 OFFICE O/P EST MOD 30 MIN: CPT | Performed by: FAMILY MEDICINE

## 2022-03-18 PROCEDURE — 1036F TOBACCO NON-USER: CPT | Performed by: FAMILY MEDICINE

## 2022-03-18 PROCEDURE — G8417 CALC BMI ABV UP PARAM F/U: HCPCS | Performed by: FAMILY MEDICINE

## 2022-03-18 PROCEDURE — G8427 DOCREV CUR MEDS BY ELIG CLIN: HCPCS | Performed by: FAMILY MEDICINE

## 2022-03-18 PROCEDURE — G8484 FLU IMMUNIZE NO ADMIN: HCPCS | Performed by: FAMILY MEDICINE

## 2022-03-18 RX ORDER — LORAZEPAM 1 MG/1
TABLET ORAL
Qty: 30 TABLET | Refills: 2 | Status: SHIPPED
Start: 2022-03-18 | End: 2022-10-05 | Stop reason: SDUPTHER

## 2022-03-18 RX ORDER — CARVEDILOL 3.12 MG/1
3.12 TABLET ORAL 2 TIMES DAILY
Qty: 60 TABLET | Refills: 5 | Status: SHIPPED
Start: 2022-03-18 | End: 2022-10-05 | Stop reason: SDUPTHER

## 2022-03-18 RX ORDER — FAMCICLOVIR 500 MG/1
500 TABLET, FILM COATED ORAL DAILY
Qty: 30 TABLET | Refills: 5 | Status: SHIPPED
Start: 2022-03-18 | End: 2022-10-05 | Stop reason: SDUPTHER

## 2022-03-18 RX ORDER — ESCITALOPRAM OXALATE 10 MG/1
10 TABLET ORAL DAILY
Qty: 30 TABLET | Refills: 5 | Status: SHIPPED
Start: 2022-03-18 | End: 2022-10-05 | Stop reason: SDUPTHER

## 2022-03-18 ASSESSMENT — ENCOUNTER SYMPTOMS
EYE REDNESS: 0
CHEST TIGHTNESS: 0
WHEEZING: 0
BLOOD IN STOOL: 0
SHORTNESS OF BREATH: 0
CONSTIPATION: 0
PHOTOPHOBIA: 0
VOMITING: 0
COUGH: 0
DIARRHEA: 0
ABDOMINAL PAIN: 0
EYES NEGATIVE: 1

## 2022-03-18 ASSESSMENT — PATIENT HEALTH QUESTIONNAIRE - PHQ9
SUM OF ALL RESPONSES TO PHQ QUESTIONS 1-9: 0
SUM OF ALL RESPONSES TO PHQ QUESTIONS 1-9: 0
1. LITTLE INTEREST OR PLEASURE IN DOING THINGS: 0
SUM OF ALL RESPONSES TO PHQ QUESTIONS 1-9: 0
SUM OF ALL RESPONSES TO PHQ9 QUESTIONS 1 & 2: 0
2. FEELING DOWN, DEPRESSED OR HOPELESS: 0
SUM OF ALL RESPONSES TO PHQ QUESTIONS 1-9: 0

## 2022-03-18 NOTE — PROGRESS NOTES
OFFICE NOTE    3/18/22  Name: Oziel Arredondo  FDD:[id number]   Sex:female   Age:51 y.o. SUBJECTIVE  Chief Complaint   Patient presents with    Anxiety    Hypertension       HPI comes in for checkup and refills. Says she is much more relaxed in new job.  had successful AVR and son seems to be doing better    Review of Systems   Constitutional: Positive for fatigue. Negative for appetite change, fever and unexpected weight change. HENT: Negative for congestion, ear pain and postnasal drip. Eyes: Negative. Negative for photophobia, redness and visual disturbance. Respiratory: Negative for cough, chest tightness, shortness of breath and wheezing. Cardiovascular: Negative for chest pain and palpitations. Gastrointestinal: Negative for abdominal pain, blood in stool, constipation, diarrhea and vomiting. Endocrine: Negative for cold intolerance, polydipsia and polyuria. Genitourinary: Positive for urgency. Negative for dysuria and hematuria. Musculoskeletal: Positive for arthralgias. Negative for gait problem and joint swelling. Skin: Negative for rash and wound. Allergic/Immunologic: Negative for environmental allergies and food allergies. Neurological: Negative for dizziness, tremors, seizures, weakness, numbness and headaches. Hematological: Negative for adenopathy. Does not bruise/bleed easily. Psychiatric/Behavioral: Negative for behavioral problems, confusion, dysphoric mood and sleep disturbance. The patient is nervous/anxious. All other systems reviewed and are negative.            Current Outpatient Medications:     famciclovir (FAMVIR) 500 MG tablet, Take 1 tablet by mouth daily, Disp: 30 tablet, Rfl: 5    escitalopram (LEXAPRO) 10 MG tablet, Take 1 tablet by mouth daily, Disp: 30 tablet, Rfl: 5    carvedilol (COREG) 3.125 MG tablet, Take 1 tablet by mouth 2 times daily, Disp: 60 tablet, Rfl: 5    LORazepam (ATIVAN) 1 MG tablet, Once daily prn anxiety as rescue, Disp: 30 tablet, Rfl: 2    magnesium (MAGNESIUM-OXIDE) 250 MG TABS tablet, Take 250 mg by mouth daily, Disp: , Rfl:     Potassium 99 MG TABS, Take by mouth, Disp: , Rfl:     Biotin 5000 MCG CAPS, Take by mouth, Disp: , Rfl:     Biotin w/ Vitamins C & E (HAIR/SKIN/NAILS PO), Take 1 tablet by mouth 3 times daily, Disp: , Rfl:     vitamin B-12 (CYANOCOBALAMIN) 100 MCG tablet, Take 100 mcg by mouth daily, Disp: , Rfl:     docusate sodium (STOOL SOFTENER) 100 MG capsule, Take 100 mg by mouth daily, Disp: , Rfl:     tretinoin (RETIN-A) 0.025 % cream, Apply topically nightly., Disp: 45 g, Rfl: 1    Multiple Vitamins-Minerals (THERAPEUTIC MULTIVITAMIN-MINERALS) tablet, Take 1 tablet by mouth daily LD 5-23-17, Disp: , Rfl:   Allergies   Allergen Reactions    Doxycycline Rash       Past Medical History:   Diagnosis Date    Pseudotumor cerebri      Past Surgical History:   Procedure Laterality Date    BACK SURGERY  11/2015    CHOLECYSTECTOMY, LAPAROSCOPIC  06/01/2017    ENDOMETRIAL ABLATION  12/2012     Family History   Problem Relation Age of Onset    Cancer Mother         breast    No Known Problems Father     Cancer Maternal Grandfather         prostate    No Known Problems Paternal Grandmother     No Known Problems Paternal Grandfather      Social History     Tobacco History     Smoking Status  Never Smoker    Smokeless Tobacco Use  Never Used          Alcohol History     Alcohol Use Status  Yes Comment  social          Drug Use     Drug Use Status  No          Sexual Activity     Sexually Active  Not Currently Partners  Male                OBJECTIVE  Vitals:    03/18/22 0829 03/18/22 0845   BP: (!) 128/90 130/78   Site: Right Upper Arm    Position: Sitting    Pulse: 112    Temp: 96.4 °F (35.8 °C)    TempSrc: Temporal    SpO2: 94%    Weight: 282 lb 9.6 oz (128.2 kg)         Body mass index is 47.03 kg/m². No orders of the defined types were placed in this encounter.        EXAM   Physical Exam  Vitals and nursing note reviewed. Constitutional:       Appearance: Normal appearance. She is well-developed. She is obese. HENT:      Right Ear: Tympanic membrane, ear canal and external ear normal.      Left Ear: Tympanic membrane, ear canal and external ear normal.      Nose: Nose normal.      Mouth/Throat:      Pharynx: Oropharynx is clear. No posterior oropharyngeal erythema. Eyes:      General: No scleral icterus. Conjunctiva/sclera: Conjunctivae normal.      Pupils: Pupils are equal, round, and reactive to light. Neck:      Thyroid: No thyroid mass or thyromegaly. Vascular: No carotid bruit or JVD. Trachea: Trachea normal.   Cardiovascular:      Rate and Rhythm: Normal rate and regular rhythm. Pulses: Normal pulses. Heart sounds: Normal heart sounds. No murmur heard. No gallop. Pulmonary:      Effort: Pulmonary effort is normal.      Breath sounds: Normal breath sounds. No wheezing, rhonchi or rales. Abdominal:      General: Bowel sounds are normal. There is no distension. Palpations: Abdomen is soft. There is no mass. Tenderness: There is no abdominal tenderness. There is no guarding. Hernia: No hernia is present. Musculoskeletal:         General: No swelling or tenderness. Normal range of motion. Cervical back: Neck supple. No tenderness. Right lower leg: No edema. Left lower leg: No edema. Lymphadenopathy:      Cervical: No cervical adenopathy. Skin:     General: Skin is warm and dry. Coloration: Skin is not jaundiced. Findings: No bruising or rash. Neurological:      General: No focal deficit present. Mental Status: She is alert and oriented to person, place, and time. Motor: No abnormal muscle tone. Psychiatric:         Mood and Affect: Mood normal.         Behavior: Behavior normal.           Felix Weldon was seen today for anxiety and hypertension.     Diagnoses and all orders for this visit:    Anxiety  - escitalopram (LEXAPRO) 10 MG tablet; Take 1 tablet by mouth daily  -     LORazepam (ATIVAN) 1 MG tablet; Once daily prn anxiety as rescue  Labs current. Doing well on present meds no changes made    Essential hypertension  -     carvedilol (COREG) 3.125 MG tablet; Take 1 tablet by mouth 2 times daily  BP adequately controlled. Believes she will be able to lose some weight this summer  Other orders  -     famciclovir (FAMVIR) 500 MG tablet; Take 1 tablet by mouth daily          No follow-ups on file.     Electronically signed by Yared Julio MD on 3/18/22 at 8:46 AM EDT

## 2022-08-06 ENCOUNTER — OFFICE VISIT (OUTPATIENT)
Dept: FAMILY MEDICINE CLINIC | Age: 52
End: 2022-08-06
Payer: COMMERCIAL

## 2022-08-06 VITALS
HEART RATE: 82 BPM | DIASTOLIC BLOOD PRESSURE: 90 MMHG | BODY MASS INDEX: 46.32 KG/M2 | TEMPERATURE: 98.4 F | WEIGHT: 278 LBS | OXYGEN SATURATION: 92 % | HEIGHT: 65 IN | RESPIRATION RATE: 18 BRPM | SYSTOLIC BLOOD PRESSURE: 132 MMHG

## 2022-08-06 DIAGNOSIS — L23.7 ALLERGIC DERMATITIS DUE TO POISON IVY: Primary | ICD-10-CM

## 2022-08-06 PROCEDURE — 1036F TOBACCO NON-USER: CPT | Performed by: INTERNAL MEDICINE

## 2022-08-06 PROCEDURE — 3017F COLORECTAL CA SCREEN DOC REV: CPT | Performed by: INTERNAL MEDICINE

## 2022-08-06 PROCEDURE — 99213 OFFICE O/P EST LOW 20 MIN: CPT | Performed by: INTERNAL MEDICINE

## 2022-08-06 PROCEDURE — G8427 DOCREV CUR MEDS BY ELIG CLIN: HCPCS | Performed by: INTERNAL MEDICINE

## 2022-08-06 PROCEDURE — G8417 CALC BMI ABV UP PARAM F/U: HCPCS | Performed by: INTERNAL MEDICINE

## 2022-08-06 PROCEDURE — 96372 THER/PROPH/DIAG INJ SC/IM: CPT | Performed by: INTERNAL MEDICINE

## 2022-08-06 RX ORDER — PREDNISONE 10 MG/1
TABLET ORAL
Qty: 12 TABLET | Refills: 0 | Status: SHIPPED | OUTPATIENT
Start: 2022-08-06 | End: 2022-08-12

## 2022-08-06 RX ORDER — TRIAMCINOLONE ACETONIDE 40 MG/ML
40 INJECTION, SUSPENSION INTRA-ARTICULAR; INTRAMUSCULAR ONCE
Status: COMPLETED | OUTPATIENT
Start: 2022-08-06 | End: 2022-08-06

## 2022-08-06 RX ADMIN — TRIAMCINOLONE ACETONIDE 40 MG: 40 INJECTION, SUSPENSION INTRA-ARTICULAR; INTRAMUSCULAR at 11:55

## 2022-08-06 ASSESSMENT — ENCOUNTER SYMPTOMS: ROS SKIN COMMENTS: SEE ABOVE

## 2022-08-07 NOTE — PROGRESS NOTES
[address] [place]     22  Bhaskar Jones : 1970 Sex: female  Age: 49 y.o. Chief Complaint   Patient presents with    Poison Ivy       HPI    Presents today complaining of poison ivy rash the past week. States she did have recent exposure and has rash to the legs, arms, back and now her face. It is very itchy. Over-the-counter medications not effective. Review of Systems   Constitutional:  Negative for chills and fever. Skin:  Negative for rash. See above           REST OF PERTINENT ROS GONE OVER AND WAS NEGATIVE. Current Outpatient Medications:     predniSONE (DELTASONE) 10 MG tablet, Take 3 tablets by mouth daily for 2 days, THEN 2 tablets daily for 2 days, THEN 1 tablet daily for 2 days. , Disp: 12 tablet, Rfl: 0    famciclovir (FAMVIR) 500 MG tablet, Take 1 tablet by mouth daily, Disp: 30 tablet, Rfl: 5    escitalopram (LEXAPRO) 10 MG tablet, Take 1 tablet by mouth daily, Disp: 30 tablet, Rfl: 5    carvedilol (COREG) 3.125 MG tablet, Take 1 tablet by mouth 2 times daily, Disp: 60 tablet, Rfl: 5    magnesium (MAGNESIUM-OXIDE) 250 MG TABS tablet, Take 250 mg by mouth daily, Disp: , Rfl:     Potassium 99 MG TABS, Take by mouth, Disp: , Rfl:     Biotin 5000 MCG CAPS, Take by mouth, Disp: , Rfl:     Biotin w/ Vitamins C & E (HAIR/SKIN/NAILS PO), Take 1 tablet by mouth 3 times daily, Disp: , Rfl:     vitamin B-12 (CYANOCOBALAMIN) 100 MCG tablet, Take 100 mcg by mouth daily, Disp: , Rfl:     docusate sodium (COLACE) 100 MG capsule, Take 100 mg by mouth daily, Disp: , Rfl:     tretinoin (RETIN-A) 0.025 % cream, Apply topically nightly., Disp: 45 g, Rfl: 1    Multiple Vitamins-Minerals (THERAPEUTIC MULTIVITAMIN-MINERALS) tablet, Take 1 tablet by mouth daily LD 17, Disp: , Rfl:   Allergies   Allergen Reactions    Doxycycline Rash       Past Medical History:   Diagnosis Date    Pseudotumor cerebri      Past Surgical History:   Procedure Laterality Date    BACK SURGERY  11/2015    CHOLECYSTECTOMY, LAPAROSCOPIC  06/01/2017    ENDOMETRIAL ABLATION  12/2012     Family History   Problem Relation Age of Onset    Cancer Mother         breast    No Known Problems Father     Cancer Maternal Grandfather         prostate    No Known Problems Paternal Grandmother     No Known Problems Paternal Grandfather      Social History     Socioeconomic History    Marital status:      Spouse name: Not on file    Number of children: Not on file    Years of education: Not on file    Highest education level: Not on file   Occupational History    Not on file   Tobacco Use    Smoking status: Never    Smokeless tobacco: Never   Vaping Use    Vaping Use: Never used   Substance and Sexual Activity    Alcohol use: Yes     Comment: social    Drug use: No    Sexual activity: Not Currently     Partners: Male   Other Topics Concern    Not on file   Social History Narrative    Not on file     Social Determinants of Health     Financial Resource Strain: Not on file   Food Insecurity: Not on file   Transportation Needs: Not on file   Physical Activity: Not on file   Stress: Not on file   Social Connections: Not on file   Intimate Partner Violence: Not on file   Housing Stability: Not on file       Vitals:    08/06/22 1132   BP: (!) 132/90   Site: Left Upper Arm   Position: Sitting   Cuff Size: Large Adult   Pulse: 82   Resp: 18   Temp: 98.4 °F (36.9 °C)   TempSrc: Temporal   SpO2: 92%   Weight: 278 lb (126.1 kg)   Height: 5' 5\" (1.651 m)       Physical Exam  Vitals and nursing note reviewed. Constitutional:       General: She is not in acute distress. Skin:     General: Skin is warm and dry. Findings: Erythema and rash present. Comments: She does have an erythematous rash with couple small vesicles in linear pattern to a few areas consistent with a contact dermatitis/poison ivy   Neurological:      Mental Status: She is alert.              Assessment and Plan:  Ainsley Daniel was seen today for poison ivy. Diagnoses and all orders for this visit:    Allergic dermatitis due to poison ivy    Other orders  -     triamcinolone acetonide (KENALOG-40) injection 40 mg  -     predniSONE (DELTASONE) 10 MG tablet; Take 3 tablets by mouth daily for 2 days, THEN 2 tablets daily for 2 days, THEN 1 tablet daily for 2 days. Plan: Shot of Kenalog and prednisone taper dose. Warned of potential side effects. Follow-up with PCP. Notify us if not improving. Return for fu pcp. Seen By:  Norma Mejia MD      *Document was created using voice recognition software. Note was reviewed however may contain grammatical errors.

## 2022-10-05 ENCOUNTER — OFFICE VISIT (OUTPATIENT)
Dept: FAMILY MEDICINE CLINIC | Age: 52
End: 2022-10-05
Payer: COMMERCIAL

## 2022-10-05 VITALS
DIASTOLIC BLOOD PRESSURE: 80 MMHG | WEIGHT: 276 LBS | HEART RATE: 78 BPM | HEIGHT: 65 IN | TEMPERATURE: 97.6 F | BODY MASS INDEX: 45.98 KG/M2 | OXYGEN SATURATION: 100 % | SYSTOLIC BLOOD PRESSURE: 122 MMHG | RESPIRATION RATE: 18 BRPM

## 2022-10-05 DIAGNOSIS — Z23 IMMUNIZATION DUE: ICD-10-CM

## 2022-10-05 DIAGNOSIS — F41.9 ANXIETY: ICD-10-CM

## 2022-10-05 DIAGNOSIS — E78.49 OTHER HYPERLIPIDEMIA: ICD-10-CM

## 2022-10-05 DIAGNOSIS — I10 ESSENTIAL HYPERTENSION: ICD-10-CM

## 2022-10-05 DIAGNOSIS — Z00.01 ENCOUNTER FOR PREVENTATIVE ADULT HEALTH CARE EXAM WITH ABNORMAL FINDINGS: ICD-10-CM

## 2022-10-05 DIAGNOSIS — E66.01 CLASS 3 SEVERE OBESITY DUE TO EXCESS CALORIES WITHOUT SERIOUS COMORBIDITY WITH BODY MASS INDEX (BMI) OF 45.0 TO 49.9 IN ADULT (HCC): Primary | ICD-10-CM

## 2022-10-05 PROBLEM — G93.2 PSEUDOTUMOR CEREBRI: Status: RESOLVED | Noted: 2020-09-21 | Resolved: 2022-10-05

## 2022-10-05 LAB
ALBUMIN SERPL-MCNC: 4.2 G/DL (ref 3.5–5.2)
ALP BLD-CCNC: 97 U/L (ref 35–104)
ALT SERPL-CCNC: 17 U/L (ref 0–32)
ANION GAP SERPL CALCULATED.3IONS-SCNC: 15 MMOL/L (ref 7–16)
AST SERPL-CCNC: 15 U/L (ref 0–31)
BASOPHILS ABSOLUTE: 0.05 E9/L (ref 0–0.2)
BASOPHILS RELATIVE PERCENT: 0.5 % (ref 0–2)
BILIRUB SERPL-MCNC: 0.2 MG/DL (ref 0–1.2)
BILIRUBIN URINE: NEGATIVE
BLOOD, URINE: NEGATIVE
BUN BLDV-MCNC: 14 MG/DL (ref 6–20)
CALCIUM SERPL-MCNC: 9.2 MG/DL (ref 8.6–10.2)
CHLORIDE BLD-SCNC: 102 MMOL/L (ref 98–107)
CHOLESTEROL, TOTAL: 228 MG/DL (ref 0–199)
CLARITY: CLEAR
CO2: 22 MMOL/L (ref 22–29)
COLOR: YELLOW
CREAT SERPL-MCNC: 0.6 MG/DL (ref 0.5–1)
EOSINOPHILS ABSOLUTE: 0.11 E9/L (ref 0.05–0.5)
EOSINOPHILS RELATIVE PERCENT: 1.1 % (ref 0–6)
GFR AFRICAN AMERICAN: >60
GFR NON-AFRICAN AMERICAN: >60 ML/MIN/1.73
GLUCOSE BLD-MCNC: 102 MG/DL (ref 74–99)
GLUCOSE URINE: NEGATIVE MG/DL
HCT VFR BLD CALC: 38.4 % (ref 34–48)
HDLC SERPL-MCNC: 68 MG/DL
HEMOGLOBIN: 12.2 G/DL (ref 11.5–15.5)
IMMATURE GRANULOCYTES #: 0.06 E9/L
IMMATURE GRANULOCYTES %: 0.6 % (ref 0–5)
KETONES, URINE: NEGATIVE MG/DL
LDL CHOLESTEROL CALCULATED: 140 MG/DL (ref 0–99)
LEUKOCYTE ESTERASE, URINE: NEGATIVE
LYMPHOCYTES ABSOLUTE: 2.53 E9/L (ref 1.5–4)
LYMPHOCYTES RELATIVE PERCENT: 24.9 % (ref 20–42)
MCH RBC QN AUTO: 29.2 PG (ref 26–35)
MCHC RBC AUTO-ENTMCNC: 31.8 % (ref 32–34.5)
MCV RBC AUTO: 91.9 FL (ref 80–99.9)
MONOCYTES ABSOLUTE: 0.75 E9/L (ref 0.1–0.95)
MONOCYTES RELATIVE PERCENT: 7.4 % (ref 2–12)
NEUTROPHILS ABSOLUTE: 6.68 E9/L (ref 1.8–7.3)
NEUTROPHILS RELATIVE PERCENT: 65.5 % (ref 43–80)
NITRITE, URINE: NEGATIVE
PDW BLD-RTO: 14.9 FL (ref 11.5–15)
PH UA: 6 (ref 5–9)
PLATELET # BLD: 230 E9/L (ref 130–450)
PMV BLD AUTO: 10.3 FL (ref 7–12)
POTASSIUM SERPL-SCNC: 4.2 MMOL/L (ref 3.5–5)
PROTEIN UA: NEGATIVE MG/DL
RBC # BLD: 4.18 E12/L (ref 3.5–5.5)
SODIUM BLD-SCNC: 139 MMOL/L (ref 132–146)
SPECIFIC GRAVITY UA: 1.02 (ref 1–1.03)
TOTAL PROTEIN: 7.3 G/DL (ref 6.4–8.3)
TRIGL SERPL-MCNC: 101 MG/DL (ref 0–149)
TSH SERPL DL<=0.05 MIU/L-ACNC: 2.82 UIU/ML (ref 0.27–4.2)
UROBILINOGEN, URINE: 0.2 E.U./DL
VLDLC SERPL CALC-MCNC: 20 MG/DL
WBC # BLD: 10.2 E9/L (ref 4.5–11.5)

## 2022-10-05 PROCEDURE — 90674 CCIIV4 VAC NO PRSV 0.5 ML IM: CPT | Performed by: FAMILY MEDICINE

## 2022-10-05 PROCEDURE — 99396 PREV VISIT EST AGE 40-64: CPT | Performed by: FAMILY MEDICINE

## 2022-10-05 PROCEDURE — 90471 IMMUNIZATION ADMIN: CPT | Performed by: FAMILY MEDICINE

## 2022-10-05 PROCEDURE — G8482 FLU IMMUNIZE ORDER/ADMIN: HCPCS | Performed by: FAMILY MEDICINE

## 2022-10-05 PROCEDURE — 81003 URINALYSIS AUTO W/O SCOPE: CPT | Performed by: FAMILY MEDICINE

## 2022-10-05 PROCEDURE — 93000 ELECTROCARDIOGRAM COMPLETE: CPT | Performed by: FAMILY MEDICINE

## 2022-10-05 RX ORDER — FAMCICLOVIR 500 MG/1
500 TABLET, FILM COATED ORAL DAILY
Qty: 30 TABLET | Refills: 5 | Status: SHIPPED | OUTPATIENT
Start: 2022-10-05

## 2022-10-05 RX ORDER — LORAZEPAM 1 MG/1
TABLET ORAL
Qty: 30 TABLET | Refills: 2 | Status: SHIPPED | OUTPATIENT
Start: 2022-10-05 | End: 2022-12-05

## 2022-10-05 RX ORDER — CARVEDILOL 3.12 MG/1
3.12 TABLET ORAL 2 TIMES DAILY
Qty: 60 TABLET | Refills: 5 | Status: SHIPPED | OUTPATIENT
Start: 2022-10-05

## 2022-10-05 RX ORDER — ESCITALOPRAM OXALATE 10 MG/1
10 TABLET ORAL DAILY
Qty: 30 TABLET | Refills: 5 | Status: SHIPPED | OUTPATIENT
Start: 2022-10-05

## 2022-10-05 SDOH — ECONOMIC STABILITY: FOOD INSECURITY: WITHIN THE PAST 12 MONTHS, THE FOOD YOU BOUGHT JUST DIDN'T LAST AND YOU DIDN'T HAVE MONEY TO GET MORE.: NEVER TRUE

## 2022-10-05 SDOH — ECONOMIC STABILITY: FOOD INSECURITY: WITHIN THE PAST 12 MONTHS, YOU WORRIED THAT YOUR FOOD WOULD RUN OUT BEFORE YOU GOT MONEY TO BUY MORE.: NEVER TRUE

## 2022-10-05 ASSESSMENT — ENCOUNTER SYMPTOMS
EYE REDNESS: 0
EYES NEGATIVE: 1
BLOOD IN STOOL: 0
DIARRHEA: 0
CONSTIPATION: 0
VOMITING: 0
WHEEZING: 0
ABDOMINAL PAIN: 0
COUGH: 0
SHORTNESS OF BREATH: 0
SINUS PRESSURE: 1
PHOTOPHOBIA: 0
CHEST TIGHTNESS: 0

## 2022-10-05 ASSESSMENT — SOCIAL DETERMINANTS OF HEALTH (SDOH): HOW HARD IS IT FOR YOU TO PAY FOR THE VERY BASICS LIKE FOOD, HOUSING, MEDICAL CARE, AND HEATING?: NOT HARD AT ALL

## 2022-10-05 NOTE — PROGRESS NOTES
OFFICE NOTE    10/5/22  Name: Rashmi Bush  CTB:[id number]   Sex:female   Age:51 y.o. 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 Review of Systems   Constitutional:  Positive for fatigue. Negative for appetite change, fever and unexpected weight change. HENT:  Positive for congestion and sinus pressure. Negative for ear pain and postnasal drip. Eyes: Negative. Negative for photophobia, redness and visual disturbance. Respiratory:  Negative for cough, chest tightness, shortness of breath and wheezing. Cardiovascular:  Negative for chest pain, palpitations and leg swelling. Gastrointestinal:  Negative for abdominal pain, blood in stool, constipation, diarrhea and vomiting. Endocrine: Negative for cold intolerance, polydipsia and polyuria. Genitourinary:  Positive for frequency and urgency. Negative for dysuria, hematuria and menstrual problem. Musculoskeletal:  Negative for arthralgias, gait problem and joint swelling. Skin:  Negative for pallor, rash and wound. Allergic/Immunologic: Negative for environmental allergies and food allergies. Neurological:  Negative for dizziness, tremors, seizures, weakness, numbness and headaches. Hematological:  Negative for adenopathy. Does not bruise/bleed easily. Psychiatric/Behavioral:  Negative for behavioral problems, confusion, dysphoric mood and sleep disturbance. The patient is not nervous/anxious. All other systems reviewed and are negative.          Current Outpatient Medications:     carvedilol (COREG) 3.125 MG tablet, Take 1 tablet by mouth 2 times daily, Disp: 60 tablet, Rfl: 5    escitalopram (LEXAPRO) 10 MG tablet, Take 1 tablet by mouth daily, Disp: 30 tablet, Rfl: 5    LORazepam (ATIVAN) 1 MG tablet, Once daily prn anxiety as rescue, Disp: 30 tablet, Rfl: 2 famciclovir (FAMVIR) 500 MG tablet, Take 1 tablet by mouth daily, Disp: 30 tablet, Rfl: 5    magnesium (MAGNESIUM-OXIDE) 250 MG TABS tablet, Take 250 mg by mouth daily, Disp: , Rfl:     Potassium 99 MG TABS, Take by mouth, Disp: , Rfl:     Biotin 5000 MCG CAPS, Take by mouth, Disp: , Rfl:     Biotin w/ Vitamins C & E (HAIR/SKIN/NAILS PO), Take 1 tablet by mouth 3 times daily, Disp: , Rfl:     vitamin B-12 (CYANOCOBALAMIN) 100 MCG tablet, Take 100 mcg by mouth daily, Disp: , Rfl:     tretinoin (RETIN-A) 0.025 % cream, Apply topically nightly., Disp: 45 g, Rfl: 1    Multiple Vitamins-Minerals (THERAPEUTIC MULTIVITAMIN-MINERALS) tablet, Take 1 tablet by mouth daily LD 5-23-17, Disp: , Rfl:     docusate sodium (COLACE) 100 MG capsule, Take 100 mg by mouth daily (Patient not taking: Reported on 10/5/2022), Disp: , Rfl:   Allergies   Allergen Reactions    Doxycycline Rash       Past Medical History:   Diagnosis Date    Anxiety 10/23/2020    Pseudotumor cerebri      Past Surgical History:   Procedure Laterality Date    BACK SURGERY  11/2015    CHOLECYSTECTOMY, LAPAROSCOPIC  06/01/2017    ENDOMETRIAL ABLATION  12/2012     Family History   Problem Relation Age of Onset    Cancer Mother         breast    No Known Problems Father     Cancer Maternal Grandfather         prostate    No Known Problems Paternal Grandmother     No Known Problems Paternal Grandfather      Social History       Tobacco History       Smoking Status  Never      Smokeless Tobacco Use  Never              Alcohol History       Alcohol Use Status  Yes Comment  social              Drug Use       Drug Use Status  No              Sexual Activity       Sexually Active  Not Currently Partners  Male                    OBJECTIVE  Vitals:    10/05/22 0806   BP: 122/80   Pulse: 78   Resp: 18   Temp: 97.6 °F (36.4 °C)   TempSrc: Temporal   SpO2: 100%   Weight: 276 lb (125.2 kg)   Height: 5' 5\" (1.651 m)        Body mass index is 45.93 kg/m².     Orders Placed This Encounter   Procedures    Influenza, FLUCELVAX, (age 10 mo+), IM, Preservative Free, 0.5 mL    CBC with Auto Differential     Standing Status:   Future     Standing Expiration Date:   10/5/2023    Comprehensive Metabolic Panel     Standing Status:   Future     Standing Expiration Date:   10/5/2023    Lipid Panel     Standing Status:   Future     Standing Expiration Date:   10/5/2023    TSH     Standing Status:   Future     Standing Expiration Date:   10/5/2023    Urinalysis     Standing Status:   Future     Standing Expiration Date:   10/5/2023    EKG 12 Lead     Standing Status:   Future     Standing Expiration Date:   10/5/2023     Order Specific Question:   Reason for Exam?     Answer:   Hypertension          EXAM   Physical Exam  Vitals and nursing note reviewed. Constitutional:       Appearance: Normal appearance. She is well-developed. She is obese. HENT:      Right Ear: Tympanic membrane, ear canal and external ear normal.      Left Ear: Tympanic membrane, ear canal and external ear normal.      Nose: Congestion and rhinorrhea present. Mouth/Throat:      Pharynx: Oropharynx is clear. No posterior oropharyngeal erythema. Eyes:      General: No scleral icterus. Conjunctiva/sclera: Conjunctivae normal.      Pupils: Pupils are equal, round, and reactive to light. Comments: May have slight icterus   Neck:      Thyroid: No thyroid mass or thyromegaly. Vascular: No JVD. Trachea: Trachea normal.   Cardiovascular:      Rate and Rhythm: Normal rate and regular rhythm. Pulses: Normal pulses. Heart sounds: Normal heart sounds. No murmur heard. No gallop. Pulmonary:      Effort: Pulmonary effort is normal.      Breath sounds: Normal breath sounds. No wheezing, rhonchi or rales. Chest:      Chest wall: No tenderness. Abdominal:      General: Bowel sounds are normal. There is no distension. Palpations: Abdomen is soft. There is no mass. Tenderness:  There is no abdominal tenderness. There is no guarding. Hernia: No hernia is present. Musculoskeletal:         General: No swelling or tenderness. Normal range of motion. Cervical back: Neck supple. No tenderness. Comments: Trace dependent edema bilaterally   Lymphadenopathy:      Cervical: No cervical adenopathy. Skin:     General: Skin is warm and dry. Capillary Refill: Capillary refill takes less than 2 seconds. Coloration: Skin is not jaundiced or pale. Findings: No bruising or rash. Comments: May be mild icterus   Neurological:      General: No focal deficit present. Mental Status: She is alert and oriented to person, place, and time. Sensory: No sensory deficit. Motor: No weakness or abnormal muscle tone. Coordination: Coordination normal.      Gait: Gait normal.      Comments: Mostly left side knee down   Psychiatric:         Mood and Affect: Mood normal.         Behavior: Behavior normal.         Caroline Starks was seen today for rash and fatigue. Diagnoses and all orders for this visit:    Class 3 severe obesity due to excess calories without serious comorbidity with body mass index (BMI) of 45.0 to 49.9 in [facility])  Pt aware and will work on this. May discuss other options down the road and screen for CHARISMA though no complaints aired  Essential hypertension  -     carvedilol (COREG) 3.125 MG tablet; Take 1 tablet by mouth 2 times daily  -     CBC with Auto Differential; Future  -     Comprehensive Metabolic Panel; Future  -     Urinalysis; Future  -     EKG 12 Lead; Future  Well controlled, no acute EKG findings  Anxiety  -     escitalopram (LEXAPRO) 10 MG tablet; Take 1 tablet by mouth daily  -     LORazepam (ATIVAN) 1 MG tablet; Once daily prn anxiety as rescue  Takes ativan about every 3rd day  Other hyperlipidemia  -     Lipid Panel; Future  -     TSH;  Future    Immunization due  -     Influenza, FLUCELVAX, (age 10 mo+), IM, Preservative Free, 0.5 mL    Other orders  -     famciclovir (FAMVIR) 500 MG tablet; Take 1 tablet by mouth daily        No follow-ups on file.     Electronically signed by Wilder Bernabe MD on 10/5/22 at 8:17 AM EDT

## 2022-10-21 ENCOUNTER — TELEPHONE (OUTPATIENT)
Dept: FAMILY MEDICINE CLINIC | Age: 52
End: 2022-10-21

## 2022-10-21 ENCOUNTER — OFFICE VISIT (OUTPATIENT)
Dept: FAMILY MEDICINE CLINIC | Age: 52
End: 2022-10-21
Payer: COMMERCIAL

## 2022-10-21 ENCOUNTER — HOSPITAL ENCOUNTER (OUTPATIENT)
Dept: ULTRASOUND IMAGING | Age: 52
Discharge: HOME | End: 2022-10-23
Payer: COMMERCIAL

## 2022-10-21 VITALS
OXYGEN SATURATION: 98 % | SYSTOLIC BLOOD PRESSURE: 126 MMHG | DIASTOLIC BLOOD PRESSURE: 84 MMHG | HEART RATE: 79 BPM | HEIGHT: 65 IN | WEIGHT: 276 LBS | TEMPERATURE: 97.3 F | BODY MASS INDEX: 45.98 KG/M2

## 2022-10-21 DIAGNOSIS — M79.605 PAIN OF LEFT LOWER EXTREMITY: ICD-10-CM

## 2022-10-21 DIAGNOSIS — M79.89 FOOT SWELLING: Primary | ICD-10-CM

## 2022-10-21 DIAGNOSIS — M79.89 FOOT SWELLING: ICD-10-CM

## 2022-10-21 PROCEDURE — 99213 OFFICE O/P EST LOW 20 MIN: CPT | Performed by: INTERNAL MEDICINE

## 2022-10-21 PROCEDURE — G8482 FLU IMMUNIZE ORDER/ADMIN: HCPCS | Performed by: INTERNAL MEDICINE

## 2022-10-21 PROCEDURE — G8427 DOCREV CUR MEDS BY ELIG CLIN: HCPCS | Performed by: INTERNAL MEDICINE

## 2022-10-21 PROCEDURE — 3017F COLORECTAL CA SCREEN DOC REV: CPT | Performed by: INTERNAL MEDICINE

## 2022-10-21 PROCEDURE — G8417 CALC BMI ABV UP PARAM F/U: HCPCS | Performed by: INTERNAL MEDICINE

## 2022-10-21 PROCEDURE — 93971 EXTREMITY STUDY: CPT

## 2022-10-21 PROCEDURE — 1036F TOBACCO NON-USER: CPT | Performed by: INTERNAL MEDICINE

## 2022-10-21 RX ORDER — FUROSEMIDE 20 MG/1
20 TABLET ORAL DAILY
Qty: 8 TABLET | Refills: 3 | Status: SHIPPED | OUTPATIENT
Start: 2022-10-21

## 2022-10-22 ASSESSMENT — ENCOUNTER SYMPTOMS
COLOR CHANGE: 0
ABDOMINAL PAIN: 0
SHORTNESS OF BREATH: 0

## 2022-10-22 NOTE — PROGRESS NOTES
MG tablet, Once daily prn anxiety as rescue, Disp: 30 tablet, Rfl: 2    famciclovir (FAMVIR) 500 MG tablet, Take 1 tablet by mouth daily, Disp: 30 tablet, Rfl: 5    magnesium (MAGNESIUM-OXIDE) 250 MG TABS tablet, Take 250 mg by mouth daily, Disp: , Rfl:     Potassium 99 MG TABS, Take by mouth, Disp: , Rfl:     Biotin 5000 MCG CAPS, Take by mouth, Disp: , Rfl:     Biotin w/ Vitamins C & E (HAIR/SKIN/NAILS PO), Take 1 tablet by mouth 3 times daily, Disp: , Rfl:     vitamin B-12 (CYANOCOBALAMIN) 100 MCG tablet, Take 100 mcg by mouth daily, Disp: , Rfl:     docusate sodium (COLACE) 100 MG capsule, Take 100 mg by mouth daily, Disp: , Rfl:     tretinoin (RETIN-A) 0.025 % cream, Apply topically nightly., Disp: 45 g, Rfl: 1    Multiple Vitamins-Minerals (THERAPEUTIC MULTIVITAMIN-MINERALS) tablet, Take 1 tablet by mouth daily LD 5-23-17, Disp: , Rfl:   Allergies   Allergen Reactions    Doxycycline Rash       Past Medical History:   Diagnosis Date    Anxiety 10/23/2020    Pseudotumor cerebri      Past Surgical History:   Procedure Laterality Date    BACK SURGERY  11/2015    CHOLECYSTECTOMY, LAPAROSCOPIC  06/01/2017    ENDOMETRIAL ABLATION  12/2012     Family History   Problem Relation Age of Onset    Cancer Mother         breast    No Known Problems Father     Cancer Maternal Grandfather         prostate    No Known Problems Paternal Grandmother     No Known Problems Paternal Grandfather      Social History     Socioeconomic History    Marital status:      Spouse name: Not on file    Number of children: Not on file    Years of education: Not on file    Highest education level: Not on file   Occupational History    Not on file   Tobacco Use    Smoking status: Never    Smokeless tobacco: Never   Vaping Use    Vaping Use: Never used   Substance and Sexual Activity    Alcohol use: Yes     Comment: social    Drug use: No    Sexual activity: Not Currently     Partners: Male   Other Topics Concern    Not on file   Social History Narrative    Not on file     Social Determinants of Health     Financial Resource Strain: Low Risk     Difficulty of Paying Living Expenses: Not hard at all   Food Insecurity: No Food Insecurity    Worried About Running Out of Food in the Last Year: Never true    Ran Out of Food in the Last Year: Never true   Transportation Needs: Not on file   Physical Activity: Not on file   Stress: Not on file   Social Connections: Not on file   Intimate Partner Violence: Not on file   Housing Stability: Not on file       Vitals:    10/21/22 1350   BP: 126/84   Pulse: 79   Temp: 97.3 °F (36.3 °C)   TempSrc: Temporal   SpO2: 98%   Weight: 276 lb (125.2 kg)   Height: 5' 5\" (1.651 m)       Physical Exam  Vitals and nursing note reviewed. Constitutional:       General: She is not in acute distress. Cardiovascular:      Rate and Rhythm: Normal rate and regular rhythm. Heart sounds: No murmur heard. Pulmonary:      Effort: Pulmonary effort is normal. No respiratory distress. Breath sounds: Normal breath sounds. No wheezing, rhonchi or rales. Abdominal:      General: Bowel sounds are normal.      Palpations: Abdomen is soft. Tenderness: There is no abdominal tenderness. Musculoskeletal:         General: Swelling and tenderness present. No signs of injury. Cervical back: Normal range of motion and neck supple. No tenderness. Comments: Examination to lower extremities demonstrating trace pitting edema lower legs bilaterally. Trace to 1+ pitting to foot and ankle region on the left. Mild tenderness to palpation along the lateral aspect of the left lower leg. Get of Homans' sign. Pulses are palpable and normal.   Skin:     General: Skin is warm and dry. Findings: No erythema or rash. Neurological:      General: No focal deficit present. Mental Status: She is alert and oriented to person, place, and time. Sensory: No sensory deficit. Motor: No weakness.       Gait: Gait normal.   Psychiatric:         Mood and Affect: Mood normal.         Behavior: Behavior normal.         Thought Content: Thought content normal.         Judgment: Judgment normal.               Assessment and Plan:  Otto Rodriguez was seen today for foot swelling. Diagnoses and all orders for this visit:    Foot swelling  -     Cancel: US DUP LOWER EXTREMITY LEFT DONNA; Future  -     US DUP LOWER EXTREMITY LEFT DONNA; Future    Pain of left lower extremity  -     Cancel: US DUP LOWER EXTREMITY LEFT DONNA; Future  -     US DUP LOWER EXTREMITY LEFT DONNA; Future    Other orders  -     furosemide (LASIX) 20 MG tablet; Take 1 tablet by mouth daily    Plan: We will get venous ultrasound to exclude DVT although my index of suspicion is not high at this time. Without any history of injury will not get plain film x-rays today. I will start her on 20 mg Lasix daily for 4 days have her follow-up with her PCP. Notify us if not improving. I did ask her to try and get up and walk at work every so often and possibly change positioning of her feet and legs while she is sitting. Return for fu pcp. Seen By:  Noel Whitney MD      *Document was created using voice recognition software. Note was reviewed however may contain grammatical errors.

## 2023-01-19 ENCOUNTER — OFFICE VISIT (OUTPATIENT)
Dept: FAMILY MEDICINE CLINIC | Age: 53
End: 2023-01-19

## 2023-01-19 VITALS
SYSTOLIC BLOOD PRESSURE: 120 MMHG | HEART RATE: 92 BPM | DIASTOLIC BLOOD PRESSURE: 80 MMHG | OXYGEN SATURATION: 96 % | TEMPERATURE: 97.7 F

## 2023-01-19 DIAGNOSIS — R51.9 NONINTRACTABLE HEADACHE, UNSPECIFIED CHRONICITY PATTERN, UNSPECIFIED HEADACHE TYPE: ICD-10-CM

## 2023-01-19 DIAGNOSIS — M79.10 MYALGIA: ICD-10-CM

## 2023-01-19 DIAGNOSIS — R30.0 DYSURIA: Primary | ICD-10-CM

## 2023-01-19 DIAGNOSIS — M54.50 BILATERAL LOW BACK PAIN WITHOUT SCIATICA, UNSPECIFIED CHRONICITY: ICD-10-CM

## 2023-01-19 LAB
BILIRUBIN, POC: NORMAL
BLOOD URINE, POC: NORMAL
CLARITY, POC: CLEAR
COLOR, POC: YELLOW
GLUCOSE URINE, POC: NORMAL
KETONES, POC: NORMAL
LEUKOCYTE EST, POC: NORMAL
NITRITE, POC: NORMAL
PH, POC: 6
PROTEIN, POC: NORMAL
SPECIFIC GRAVITY, POC: 10.2
UROBILINOGEN, POC: 0.2

## 2023-01-19 RX ORDER — NAPROXEN 500 MG/1
500 TABLET ORAL 2 TIMES DAILY PRN
Qty: 15 TABLET | Refills: 0 | Status: SHIPPED | OUTPATIENT
Start: 2023-01-19

## 2023-01-19 RX ORDER — FUROSEMIDE 20 MG/1
20 TABLET ORAL DAILY
Qty: 8 TABLET | Refills: 3 | Status: SHIPPED | OUTPATIENT
Start: 2023-01-19

## 2023-01-19 RX ORDER — KETOROLAC TROMETHAMINE 30 MG/ML
30 INJECTION, SOLUTION INTRAMUSCULAR; INTRAVENOUS ONCE
Status: COMPLETED | OUTPATIENT
Start: 2023-01-19 | End: 2023-01-19

## 2023-01-19 RX ADMIN — KETOROLAC TROMETHAMINE 30 MG: 30 INJECTION, SOLUTION INTRAMUSCULAR; INTRAVENOUS at 11:43

## 2023-01-19 ASSESSMENT — ENCOUNTER SYMPTOMS
BACK PAIN: 1
VOMITING: 0
COUGH: 0
DIARRHEA: 0
NAUSEA: 0
ABDOMINAL PAIN: 0
SHORTNESS OF BREATH: 0

## 2023-01-19 NOTE — TELEPHONE ENCOUNTER
Last Appointment:  10/5/2022  Future Appointments   Date Time Provider Andre Salazar   4/5/2023  8:00 AM Natalie Flores  [address]

## 2023-01-20 NOTE — PROGRESS NOTES
[address] [place]     23  Tutu Mota : 1970 Sex: female  Age: 47 y.o. Chief Complaint   Patient presents with    Back Pain       HPI  Patient presents today to express care complaining of bilateral low back pain started about 6 days ago. States that she has had some pelvic area pressure and urinary frequency and concern for UTI. Yesterday developed body aches and headache denies any dysuria with frequency. Pain or hematuria. Denies any radicular symptoms with low back pain. She has been using some occasional ibuprofen for the back pain. Review of Systems   Constitutional:  Negative for chills and fever. HENT: Negative. Respiratory:  Negative for cough and shortness of breath. Cardiovascular:  Negative for chest pain. Gastrointestinal:  Negative for abdominal pain, diarrhea, nausea and vomiting. Genitourinary:  Positive for frequency. Negative for dysuria, flank pain, hematuria, pelvic pain and vaginal discharge. Musculoskeletal:  Positive for back pain and myalgias. Skin:  Negative for rash. Neurological:  Positive for headaches. REST OF PERTINENT ROS GONE OVER AND WAS NEGATIVE.                  Current Outpatient Medications:     naproxen (NAPROSYN) 500 MG tablet, Take 1 tablet by mouth 2 times daily as needed for Pain, Disp: 15 tablet, Rfl: 0    carvedilol (COREG) 3.125 MG tablet, Take 1 tablet by mouth 2 times daily, Disp: 60 tablet, Rfl: 5    escitalopram (LEXAPRO) 10 MG tablet, Take 1 tablet by mouth daily, Disp: 30 tablet, Rfl: 5    magnesium (MAGNESIUM-OXIDE) 250 MG TABS tablet, Take 250 mg by mouth daily, Disp: , Rfl:     Potassium 99 MG TABS, Take by mouth, Disp: , Rfl:     Multiple Vitamins-Minerals (THERAPEUTIC MULTIVITAMIN-MINERALS) tablet, Take 1 tablet by mouth daily LD 17, Disp: , Rfl:     furosemide (LASIX) 20 MG tablet, Take 1 tablet by mouth daily, Disp: 8 tablet, Rfl: 3    famciclovir (FAMVIR) 500 MG tablet, Take 1 tablet by mouth daily, Disp: 30 tablet, Rfl: 5    Biotin 5000 MCG CAPS, Take by mouth, Disp: , Rfl:     Biotin w/ Vitamins C & E (HAIR/SKIN/NAILS PO), Take 1 tablet by mouth 3 times daily, Disp: , Rfl:     vitamin B-12 (CYANOCOBALAMIN) 100 MCG tablet, Take 100 mcg by mouth daily, Disp: , Rfl:     docusate sodium (COLACE) 100 MG capsule, Take 100 mg by mouth daily, Disp: , Rfl:     tretinoin (RETIN-A) 0.025 % cream, Apply topically nightly., Disp: 45 g, Rfl: 1  Allergies   Allergen Reactions    Doxycycline Rash       Past Medical History:   Diagnosis Date    Anxiety 10/23/2020    Pseudotumor cerebri      Past Surgical History:   Procedure Laterality Date    BACK SURGERY  11/2015    CHOLECYSTECTOMY, LAPAROSCOPIC  06/01/2017    ENDOMETRIAL ABLATION  12/2012     Family History   Problem Relation Age of Onset    Cancer Mother         breast    No Known Problems Father     Cancer Maternal Grandfather         prostate    No Known Problems Paternal Grandmother     No Known Problems Paternal Grandfather      Social History     Socioeconomic History    Marital status:      Spouse name: Not on file    Number of children: Not on file    Years of education: Not on file    Highest education level: Not on file   Occupational History    Not on file   Tobacco Use    Smoking status: Never    Smokeless tobacco: Never   Vaping Use    Vaping Use: Never used   Substance and Sexual Activity    Alcohol use: Yes     Comment: social    Drug use: No    Sexual activity: Not Currently     Partners: Male   Other Topics Concern    Not on file   Social History Narrative    Not on file     Social Determinants of Health     Financial Resource Strain: Low Risk     Difficulty of Paying Living Expenses: Not hard at all   Food Insecurity: No Food Insecurity    Worried About Running Out of Food in the Last Year: Never true    Ran Out of Food in the Last Year: Never true   Transportation Needs: Not on file   Physical Activity: Not on file   Stress: Not on file   Social Connections: Not on file   Intimate Partner Violence: Not on file   Housing Stability: Not on file       Vitals:    01/19/23 1124   BP: 120/80   Pulse: 92   Temp: 97.7 °F (36.5 °C)   SpO2: 96%       Physical Exam  Vitals and nursing note reviewed. Constitutional:       General: She is not in acute distress. Appearance: She is obese. Cardiovascular:      Rate and Rhythm: Normal rate and regular rhythm. Pulmonary:      Effort: Pulmonary effort is normal.      Breath sounds: Normal breath sounds. Abdominal:      General: Bowel sounds are normal.      Palpations: Abdomen is soft. Tenderness: There is no abdominal tenderness. There is no right CVA tenderness or left CVA tenderness. Comments: No suprapubic tenderness to palpation nor flank tenderness to percussion noted. Musculoskeletal:         General: Normal range of motion. Cervical back: Normal range of motion and neck supple. No tenderness. Comments: She did have some reproducible tenderness to palpation lower back bilaterally lumbar paraspinal region and lateral to that. Skin:     General: Skin is warm and dry. Neurological:      Mental Status: She is alert and oriented to person, place, and time. Sensory: No sensory deficit. Motor: No weakness. Psychiatric:         Mood and Affect: Mood normal.         Behavior: Behavior normal.         Thought Content: Thought content normal.         Judgment: Judgment normal.               Assessment and Plan:  Nick Garcia was seen today for back pain.     Diagnoses and all orders for this visit:    Dysuria  -     POCT Urinalysis no Micro    Bilateral low back pain without sciatica, unspecified chronicity    Myalgia  -     POCT Influenza A/B  -     POCT COVID-19, Antigen    Nonintractable headache, unspecified chronicity pattern, unspecified headache type  -     POCT Influenza A/B  -     POCT COVID-19, Antigen    Other orders  -     naproxen (NAPROSYN) 500 MG tablet; Take 1 tablet by mouth 2 times daily as needed for Pain  -     ketorolac (TORADOL) injection 30 mg    Plan: For the back pain I started naproxen warned of potential side effects. Also gave a shot of Toradol and recommended heat to the low back. Test for both COVID and flu both were negative. Dip was also negative. He was not treated with any antibiotics at this time. Push fluids. Follow-up with PCP. Notify us if not improving. Return for fu pcp. Seen By:  Celsa Lester MD      *Document was created using voice recognition software. Note was reviewed however may contain grammatical errors.

## 2023-04-05 ENCOUNTER — OFFICE VISIT (OUTPATIENT)
Dept: FAMILY MEDICINE CLINIC | Age: 53
End: 2023-04-05
Payer: COMMERCIAL

## 2023-04-05 VITALS
BODY MASS INDEX: 48.82 KG/M2 | DIASTOLIC BLOOD PRESSURE: 84 MMHG | SYSTOLIC BLOOD PRESSURE: 134 MMHG | TEMPERATURE: 97.2 F | WEIGHT: 293 LBS | RESPIRATION RATE: 18 BRPM | HEART RATE: 96 BPM | OXYGEN SATURATION: 92 % | HEIGHT: 65 IN

## 2023-04-05 DIAGNOSIS — I10 ESSENTIAL HYPERTENSION: ICD-10-CM

## 2023-04-05 DIAGNOSIS — F41.9 ANXIETY: ICD-10-CM

## 2023-04-05 LAB
ANION GAP SERPL CALCULATED.3IONS-SCNC: 18 MMOL/L (ref 7–16)
BUN SERPL-MCNC: 15 MG/DL (ref 6–20)
CALCIUM SERPL-MCNC: 9.9 MG/DL (ref 8.6–10.2)
CHLORIDE SERPL-SCNC: 106 MMOL/L (ref 98–107)
CO2 SERPL-SCNC: 21 MMOL/L (ref 22–29)
CREAT SERPL-MCNC: 0.8 MG/DL (ref 0.5–1)
GLUCOSE SERPL-MCNC: 121 MG/DL (ref 74–99)
POTASSIUM SERPL-SCNC: 4.5 MMOL/L (ref 3.5–5)
SODIUM SERPL-SCNC: 145 MMOL/L (ref 132–146)
TSH SERPL-MCNC: 2.42 UIU/ML (ref 0.27–4.2)

## 2023-04-05 PROCEDURE — 3079F DIAST BP 80-89 MM HG: CPT | Performed by: FAMILY MEDICINE

## 2023-04-05 PROCEDURE — 3075F SYST BP GE 130 - 139MM HG: CPT | Performed by: FAMILY MEDICINE

## 2023-04-05 PROCEDURE — 3017F COLORECTAL CA SCREEN DOC REV: CPT | Performed by: FAMILY MEDICINE

## 2023-04-05 PROCEDURE — 99214 OFFICE O/P EST MOD 30 MIN: CPT | Performed by: FAMILY MEDICINE

## 2023-04-05 PROCEDURE — G8427 DOCREV CUR MEDS BY ELIG CLIN: HCPCS | Performed by: FAMILY MEDICINE

## 2023-04-05 PROCEDURE — 1036F TOBACCO NON-USER: CPT | Performed by: FAMILY MEDICINE

## 2023-04-05 PROCEDURE — G8417 CALC BMI ABV UP PARAM F/U: HCPCS | Performed by: FAMILY MEDICINE

## 2023-04-05 RX ORDER — FUROSEMIDE 20 MG/1
20 TABLET ORAL DAILY
Qty: 8 TABLET | Refills: 3 | Status: SHIPPED | OUTPATIENT
Start: 2023-04-05

## 2023-04-05 RX ORDER — CARVEDILOL 3.12 MG/1
3.12 TABLET ORAL 2 TIMES DAILY
Qty: 60 TABLET | Refills: 5 | Status: SHIPPED | OUTPATIENT
Start: 2023-04-05

## 2023-04-05 RX ORDER — NAPROXEN 500 MG/1
500 TABLET ORAL 2 TIMES DAILY PRN
Qty: 15 TABLET | Refills: 0 | Status: SHIPPED | OUTPATIENT
Start: 2023-04-05

## 2023-04-05 RX ORDER — LORAZEPAM 1 MG/1
TABLET ORAL
Qty: 30 TABLET | Refills: 2 | Status: SHIPPED | OUTPATIENT
Start: 2023-04-05 | End: 2023-06-05

## 2023-04-05 RX ORDER — ESCITALOPRAM OXALATE 10 MG/1
15 TABLET ORAL DAILY
Qty: 45 TABLET | Refills: 5 | Status: SHIPPED | OUTPATIENT
Start: 2023-04-05

## 2023-04-05 RX ORDER — FAMCICLOVIR 500 MG/1
500 TABLET ORAL DAILY
Qty: 30 TABLET | Refills: 5 | Status: SHIPPED | OUTPATIENT
Start: 2023-04-05

## 2023-04-05 SDOH — ECONOMIC STABILITY: INCOME INSECURITY: HOW HARD IS IT FOR YOU TO PAY FOR THE VERY BASICS LIKE FOOD, HOUSING, MEDICAL CARE, AND HEATING?: NOT HARD AT ALL

## 2023-04-05 SDOH — ECONOMIC STABILITY: FOOD INSECURITY: WITHIN THE PAST 12 MONTHS, THE FOOD YOU BOUGHT JUST DIDN'T LAST AND YOU DIDN'T HAVE MONEY TO GET MORE.: NEVER TRUE

## 2023-04-05 SDOH — ECONOMIC STABILITY: HOUSING INSECURITY
IN THE LAST 12 MONTHS, WAS THERE A TIME WHEN YOU DID NOT HAVE A STEADY PLACE TO SLEEP OR SLEPT IN A SHELTER (INCLUDING NOW)?: NO

## 2023-04-05 SDOH — ECONOMIC STABILITY: FOOD INSECURITY: WITHIN THE PAST 12 MONTHS, YOU WORRIED THAT YOUR FOOD WOULD RUN OUT BEFORE YOU GOT MONEY TO BUY MORE.: NEVER TRUE

## 2023-04-05 ASSESSMENT — PATIENT HEALTH QUESTIONNAIRE - PHQ9
SUM OF ALL RESPONSES TO PHQ QUESTIONS 1-9: 0
SUM OF ALL RESPONSES TO PHQ QUESTIONS 1-9: 0
2. FEELING DOWN, DEPRESSED OR HOPELESS: 0
SUM OF ALL RESPONSES TO PHQ9 QUESTIONS 1 & 2: 0
SUM OF ALL RESPONSES TO PHQ QUESTIONS 1-9: 0
1. LITTLE INTEREST OR PLEASURE IN DOING THINGS: 0
SUM OF ALL RESPONSES TO PHQ QUESTIONS 1-9: 0

## 2023-04-05 ASSESSMENT — ENCOUNTER SYMPTOMS
PHOTOPHOBIA: 0
EYES NEGATIVE: 1
DIARRHEA: 0
COUGH: 0
EYE REDNESS: 0
VOMITING: 0
ABDOMINAL PAIN: 0
BLOOD IN STOOL: 0
SHORTNESS OF BREATH: 0
CONSTIPATION: 0
SINUS PRESSURE: 0
SORE THROAT: 0
WHEEZING: 0
CHEST TIGHTNESS: 0

## 2023-04-05 ASSESSMENT — LIFESTYLE VARIABLES
HOW MANY STANDARD DRINKS CONTAINING ALCOHOL DO YOU HAVE ON A TYPICAL DAY: 1 OR 2
HOW OFTEN DO YOU HAVE A DRINK CONTAINING ALCOHOL: MONTHLY OR LESS

## 2023-04-05 NOTE — PROGRESS NOTES
Coordination: Coordination normal.      Gait: Gait normal.   Psychiatric:         Mood and Affect: Mood normal.         Behavior: Behavior normal.         Charles Perez was seen today for medication refill. Diagnoses and all orders for this visit:    Anxiety  -     escitalopram (LEXAPRO) 10 MG tablet; Take 1.5 tablets by mouth daily  -     LORazepam (ATIVAN) 1 MG tablet; Once daily prn anxiety as rescue  -     TSH; Future  OCD getting a little worse. Will increase lexapro to 15 mg per day, ativan as rescue  Essential hypertension  -     carvedilol (COREG) 3.125 MG tablet; Take 1 tablet by mouth 2 times daily  -     Basic Metabolic Panel; Future  Normotensive. May need to increase Coreg soon  Other orders  -     famciclovir (FAMVIR) 500 MG tablet; Take 1 tablet by mouth daily  -     naproxen (NAPROSYN) 500 MG tablet; Take 1 tablet by mouth 2 times daily as needed for Pain  -     furosemide (LASIX) 20 MG tablet; Take 1 tablet by mouth daily  Was put on lasix by another provider. Need to check electrolytes        No follow-ups on file.     Electronically signed by Arnulfo Monroe MD on 4/5/23 at 8:22 AM EDT

## 2023-08-04 ENCOUNTER — OFFICE VISIT (OUTPATIENT)
Dept: FAMILY MEDICINE CLINIC | Age: 53
End: 2023-08-04
Payer: COMMERCIAL

## 2023-08-04 VITALS
RESPIRATION RATE: 18 BRPM | DIASTOLIC BLOOD PRESSURE: 82 MMHG | TEMPERATURE: 97.8 F | OXYGEN SATURATION: 98 % | HEART RATE: 82 BPM | SYSTOLIC BLOOD PRESSURE: 124 MMHG | HEIGHT: 65 IN | BODY MASS INDEX: 48.92 KG/M2

## 2023-08-04 DIAGNOSIS — J40 SINOBRONCHITIS: Primary | ICD-10-CM

## 2023-08-04 DIAGNOSIS — J32.9 SINOBRONCHITIS: Primary | ICD-10-CM

## 2023-08-04 PROCEDURE — 99213 OFFICE O/P EST LOW 20 MIN: CPT | Performed by: PHYSICIAN ASSISTANT

## 2023-08-04 PROCEDURE — 3017F COLORECTAL CA SCREEN DOC REV: CPT | Performed by: PHYSICIAN ASSISTANT

## 2023-08-04 PROCEDURE — 3079F DIAST BP 80-89 MM HG: CPT | Performed by: PHYSICIAN ASSISTANT

## 2023-08-04 PROCEDURE — 3074F SYST BP LT 130 MM HG: CPT | Performed by: PHYSICIAN ASSISTANT

## 2023-08-04 PROCEDURE — G8427 DOCREV CUR MEDS BY ELIG CLIN: HCPCS | Performed by: PHYSICIAN ASSISTANT

## 2023-08-04 PROCEDURE — 1036F TOBACCO NON-USER: CPT | Performed by: PHYSICIAN ASSISTANT

## 2023-08-04 PROCEDURE — G8417 CALC BMI ABV UP PARAM F/U: HCPCS | Performed by: PHYSICIAN ASSISTANT

## 2023-08-04 RX ORDER — METHYLPREDNISOLONE 4 MG/1
TABLET ORAL
Qty: 1 KIT | Refills: 0 | Status: SHIPPED | OUTPATIENT
Start: 2023-08-04 | End: 2023-08-10

## 2023-08-04 RX ORDER — CEFDINIR 300 MG/1
300 CAPSULE ORAL 2 TIMES DAILY
Qty: 20 CAPSULE | Refills: 0 | Status: SHIPPED | OUTPATIENT
Start: 2023-08-04 | End: 2023-08-14

## 2023-08-04 NOTE — PROGRESS NOTES
Chief Complaint       Cough (X 1 week, nonproductive ), Nasal Congestion, and Headache      History of Present Illness   Source of history provided by:  patient. Mariela Saini is a 47 y.o. old female presenting to the walk in clinic for evaluation of a nonproductive cough which has been present for the past week. Patient is also complaining of nasal congestion, sinus pressure, and sinus headaches which have been present for the past few days. Denies any fever, chills, loss of taste or smell, CP, dyspnea, LE edema, abdominal pain, vomiting, rash, or lethargy. Pt reports a remote history of reactive airway disease which required albuterol in the past. Patient denies recent sick exposures. ROS    Unless otherwise stated in this report or unable to obtain because of the patient's clinical or mental status as evidenced by the medical record, this patients's positive and negative responses for Review of Systems, constitutional, psych, eyes, ENT, cardiovascular, respiratory, gastrointestinal, neurological, genitourinary, musculoskeletal, integument systems and systems related to the presenting problem are either stated in the preceding or were not pertinent or were negative for the symptoms and/or complaints related to the medical problem. Past Medical History:  has a past medical history of Anxiety and Pseudotumor cerebri. Past Surgical History:  has a past surgical history that includes back surgery (11/2015); Endometrial ablation (12/2012); and Cholecystectomy, laparoscopic (06/01/2017). Social History:  reports that she has never smoked. She has never used smokeless tobacco. She reports current alcohol use. She reports that she does not use drugs. Family History: family history includes Cancer in her maternal grandfather and mother; No Known Problems in her father, paternal grandfather, and paternal grandmother.    Allergies: Doxycycline    Physical Exam         VS:  /82   Pulse 82   Temp

## 2023-08-29 DIAGNOSIS — F41.9 ANXIETY: ICD-10-CM

## 2023-08-29 RX ORDER — ESCITALOPRAM OXALATE 10 MG/1
15 TABLET ORAL DAILY
Qty: 135 TABLET | Refills: 1 | Status: SHIPPED | OUTPATIENT
Start: 2023-08-29

## 2023-08-29 NOTE — TELEPHONE ENCOUNTER
Last Appointment:  4/5/2023  Future Appointments   Date Time Provider [address]   10/5/2023  8:45 AM Bhavana Loredo  [facility] [employer]

## 2023-10-05 ENCOUNTER — OFFICE VISIT (OUTPATIENT)
Dept: FAMILY MEDICINE CLINIC | Age: 53
End: 2023-10-05
Payer: COMMERCIAL

## 2023-10-05 VITALS
HEART RATE: 100 BPM | BODY MASS INDEX: 48.82 KG/M2 | WEIGHT: 293 LBS | DIASTOLIC BLOOD PRESSURE: 82 MMHG | TEMPERATURE: 97.1 F | HEIGHT: 65 IN | OXYGEN SATURATION: 93 % | SYSTOLIC BLOOD PRESSURE: 136 MMHG | RESPIRATION RATE: 18 BRPM

## 2023-10-05 DIAGNOSIS — F41.9 ANXIETY: ICD-10-CM

## 2023-10-05 DIAGNOSIS — E66.01 CLASS 3 SEVERE OBESITY DUE TO EXCESS CALORIES WITHOUT SERIOUS COMORBIDITY WITH BODY MASS INDEX (BMI) OF 45.0 TO 49.9 IN ADULT (HCC): ICD-10-CM

## 2023-10-05 DIAGNOSIS — E66.01 CLASS 3 SEVERE OBESITY DUE TO EXCESS CALORIES WITHOUT SERIOUS COMORBIDITY WITH BODY MASS INDEX (BMI) OF 45.0 TO 49.9 IN ADULT (HCC): Primary | ICD-10-CM

## 2023-10-05 DIAGNOSIS — G47.33 OSA ON CPAP: ICD-10-CM

## 2023-10-05 DIAGNOSIS — I10 ESSENTIAL HYPERTENSION: ICD-10-CM

## 2023-10-05 DIAGNOSIS — E78.49 OTHER HYPERLIPIDEMIA: ICD-10-CM

## 2023-10-05 LAB
ABSOLUTE IMMATURE GRANULOCYTE: 0.05 K/UL (ref 0–0.58)
ALBUMIN SERPL-MCNC: 4.2 G/DL (ref 3.5–5.2)
ALP BLD-CCNC: 90 U/L (ref 35–104)
ALT SERPL-CCNC: 22 U/L (ref 0–32)
ANION GAP SERPL CALCULATED.3IONS-SCNC: 14 MMOL/L (ref 7–16)
AST SERPL-CCNC: 20 U/L (ref 0–31)
BASOPHILS ABSOLUTE: 0.06 K/UL (ref 0–0.2)
BASOPHILS RELATIVE PERCENT: 1 % (ref 0–2)
BILIRUB SERPL-MCNC: 0.2 MG/DL (ref 0–1.2)
BUN BLDV-MCNC: 11 MG/DL (ref 6–20)
CALCIUM SERPL-MCNC: 9 MG/DL (ref 8.6–10.2)
CHLORIDE BLD-SCNC: 104 MMOL/L (ref 98–107)
CHOLESTEROL: 213 MG/DL
CO2: 22 MMOL/L (ref 22–29)
CREAT SERPL-MCNC: 0.7 MG/DL (ref 0.5–1)
EOSINOPHILS ABSOLUTE: 0.09 K/UL (ref 0.05–0.5)
EOSINOPHILS RELATIVE PERCENT: 1 % (ref 0–6)
GFR SERPL CREATININE-BSD FRML MDRD: >60 ML/MIN/1.73M2
GLUCOSE BLD-MCNC: 91 MG/DL (ref 74–99)
HBA1C MFR BLD: 5.8 % (ref 4–5.6)
HCT VFR BLD CALC: 39.7 % (ref 34–48)
HDLC SERPL-MCNC: 44 MG/DL
HEMOGLOBIN: 12.6 G/DL (ref 11.5–15.5)
IMMATURE GRANULOCYTES: 1 % (ref 0–5)
LDL CHOLESTEROL: 122 MG/DL
LYMPHOCYTES ABSOLUTE: 2.93 K/UL (ref 1.5–4)
LYMPHOCYTES RELATIVE PERCENT: 32 % (ref 20–42)
MCH RBC QN AUTO: 30.3 PG (ref 26–35)
MCHC RBC AUTO-ENTMCNC: 31.7 G/DL (ref 32–34.5)
MCV RBC AUTO: 95.4 FL (ref 80–99.9)
MONOCYTES ABSOLUTE: 0.7 K/UL (ref 0.1–0.95)
MONOCYTES RELATIVE PERCENT: 8 % (ref 2–12)
NEUTROPHILS ABSOLUTE: 5.23 K/UL (ref 1.8–7.3)
NEUTROPHILS RELATIVE PERCENT: 58 % (ref 43–80)
PDW BLD-RTO: 13.2 % (ref 11.5–15)
PLATELET # BLD: 230 K/UL (ref 130–450)
PMV BLD AUTO: 10 FL (ref 7–12)
POTASSIUM SERPL-SCNC: 4.1 MMOL/L (ref 3.5–5)
RBC # BLD: 4.16 M/UL (ref 3.5–5.5)
SODIUM BLD-SCNC: 140 MMOL/L (ref 132–146)
TOTAL PROTEIN: 6.9 G/DL (ref 6.4–8.3)
TRIGL SERPL-MCNC: 234 MG/DL
TSH SERPL DL<=0.05 MIU/L-ACNC: 2.85 UIU/ML (ref 0.27–4.2)
VLDLC SERPL CALC-MCNC: 47 MG/DL
WBC # BLD: 9.1 K/UL (ref 4.5–11.5)

## 2023-10-05 PROCEDURE — G8417 CALC BMI ABV UP PARAM F/U: HCPCS | Performed by: FAMILY MEDICINE

## 2023-10-05 PROCEDURE — 99214 OFFICE O/P EST MOD 30 MIN: CPT | Performed by: FAMILY MEDICINE

## 2023-10-05 PROCEDURE — 3017F COLORECTAL CA SCREEN DOC REV: CPT | Performed by: FAMILY MEDICINE

## 2023-10-05 PROCEDURE — G8484 FLU IMMUNIZE NO ADMIN: HCPCS | Performed by: FAMILY MEDICINE

## 2023-10-05 PROCEDURE — 1036F TOBACCO NON-USER: CPT | Performed by: FAMILY MEDICINE

## 2023-10-05 PROCEDURE — G8427 DOCREV CUR MEDS BY ELIG CLIN: HCPCS | Performed by: FAMILY MEDICINE

## 2023-10-05 PROCEDURE — 3079F DIAST BP 80-89 MM HG: CPT | Performed by: FAMILY MEDICINE

## 2023-10-05 PROCEDURE — 3075F SYST BP GE 130 - 139MM HG: CPT | Performed by: FAMILY MEDICINE

## 2023-10-05 RX ORDER — ESCITALOPRAM OXALATE 10 MG/1
15 TABLET ORAL DAILY
Qty: 135 TABLET | Refills: 1 | Status: SHIPPED | OUTPATIENT
Start: 2023-10-05

## 2023-10-05 RX ORDER — FUROSEMIDE 20 MG/1
20 TABLET ORAL PRN
Qty: 8 TABLET | Refills: 3 | Status: SHIPPED | OUTPATIENT
Start: 2023-10-05

## 2023-10-05 RX ORDER — CARVEDILOL 3.12 MG/1
3.12 TABLET ORAL 2 TIMES DAILY
Qty: 180 TABLET | Refills: 1 | Status: SHIPPED | OUTPATIENT
Start: 2023-10-05

## 2023-10-05 RX ORDER — FAMCICLOVIR 500 MG/1
500 TABLET ORAL DAILY
Qty: 90 TABLET | Refills: 1 | Status: SHIPPED | OUTPATIENT
Start: 2023-10-05

## 2023-10-05 ASSESSMENT — ENCOUNTER SYMPTOMS
CONSTIPATION: 0
BLOOD IN STOOL: 0
DIARRHEA: 0
VOMITING: 0
CHEST TIGHTNESS: 0
PHOTOPHOBIA: 0
SHORTNESS OF BREATH: 1
EYE REDNESS: 0
COUGH: 0
ABDOMINAL PAIN: 0
EYES NEGATIVE: 1
WHEEZING: 0

## 2023-10-05 NOTE — PROGRESS NOTES
in about 6 months (around 4/5/2024).     Electronically signed by Neil Herman MD on 10/5/23 at 10:04 AM EDT

## 2023-11-21 ENCOUNTER — OFFICE VISIT (OUTPATIENT)
Dept: FAMILY MEDICINE CLINIC | Age: 53
End: 2023-11-21
Payer: COMMERCIAL

## 2023-11-21 VITALS
RESPIRATION RATE: 18 BRPM | DIASTOLIC BLOOD PRESSURE: 70 MMHG | TEMPERATURE: 97.7 F | SYSTOLIC BLOOD PRESSURE: 120 MMHG | OXYGEN SATURATION: 94 % | HEART RATE: 96 BPM

## 2023-11-21 DIAGNOSIS — U07.1 COVID-19: Primary | ICD-10-CM

## 2023-11-21 DIAGNOSIS — H66.002 NON-RECURRENT ACUTE SUPPURATIVE OTITIS MEDIA OF LEFT EAR WITHOUT SPONTANEOUS RUPTURE OF TYMPANIC MEMBRANE: ICD-10-CM

## 2023-11-21 LAB
Lab: ABNORMAL
PERFORMING INSTRUMENT: ABNORMAL
QC PASS/FAIL: ABNORMAL
SARS-COV-2, POC: DETECTED

## 2023-11-21 PROCEDURE — 3017F COLORECTAL CA SCREEN DOC REV: CPT | Performed by: PHYSICIAN ASSISTANT

## 2023-11-21 PROCEDURE — 3078F DIAST BP <80 MM HG: CPT | Performed by: PHYSICIAN ASSISTANT

## 2023-11-21 PROCEDURE — 1036F TOBACCO NON-USER: CPT | Performed by: PHYSICIAN ASSISTANT

## 2023-11-21 PROCEDURE — 99213 OFFICE O/P EST LOW 20 MIN: CPT | Performed by: PHYSICIAN ASSISTANT

## 2023-11-21 PROCEDURE — G8427 DOCREV CUR MEDS BY ELIG CLIN: HCPCS | Performed by: PHYSICIAN ASSISTANT

## 2023-11-21 PROCEDURE — G8417 CALC BMI ABV UP PARAM F/U: HCPCS | Performed by: PHYSICIAN ASSISTANT

## 2023-11-21 PROCEDURE — 87426 SARSCOV CORONAVIRUS AG IA: CPT | Performed by: PHYSICIAN ASSISTANT

## 2023-11-21 PROCEDURE — G8484 FLU IMMUNIZE NO ADMIN: HCPCS | Performed by: PHYSICIAN ASSISTANT

## 2023-11-21 PROCEDURE — 3074F SYST BP LT 130 MM HG: CPT | Performed by: PHYSICIAN ASSISTANT

## 2023-11-21 RX ORDER — CEFDINIR 300 MG/1
300 CAPSULE ORAL 2 TIMES DAILY
Qty: 20 CAPSULE | Refills: 0 | Status: SHIPPED | OUTPATIENT
Start: 2023-11-21 | End: 2023-12-01

## 2023-11-21 RX ORDER — BENZONATATE 200 MG/1
200 CAPSULE ORAL 3 TIMES DAILY PRN
Qty: 15 CAPSULE | Refills: 0 | Status: SHIPPED | OUTPATIENT
Start: 2023-11-21